# Patient Record
Sex: FEMALE | Race: OTHER | HISPANIC OR LATINO | Employment: FULL TIME | URBAN - METROPOLITAN AREA
[De-identification: names, ages, dates, MRNs, and addresses within clinical notes are randomized per-mention and may not be internally consistent; named-entity substitution may affect disease eponyms.]

---

## 2019-05-01 LAB
C TRACH RRNA SPEC QL PROBE: NEGATIVE
CYSTIC FIBROSIS MUTATION PNL: NEGATIVE
GLUCOSE SERPL-MCNC: 90 MG/DL
HBV SURFACE AG SERPL QL IA: NEGATIVE
HCT VFR BLD AUTO: 35 % (ref 36–46)
HGB BLD-MCNC: 11.7 G/DL (ref 12–16)
HIV 1+2 AB+HIV1 P24 AG SERPL QL IA: NON REACTIVE
N GONORRHOEAE, AMPLIFIED DNA: NEGATIVE
PLATELET # BLD AUTO: 267 K/?L (ref 150–399)
RPR: NON REACTIVE
URINE CULTURE, ROUTINE: NEGATIVE

## 2019-08-26 ENCOUNTER — TELEPHONE (OUTPATIENT)
Dept: OBSTETRICS AND GYNECOLOGY | Facility: CLINIC | Age: 39
End: 2019-08-26

## 2019-08-26 NOTE — TELEPHONE ENCOUNTER
Pt is currently 26 weeks pregnant and would like to see if she can switch care to . She is currently seeing Dr.Pratibha Leon at [facility]. Pt said at her 20 week anatomy scan they told her the umbilical chord was a little off centered so they would like to monitor that but other than that it has been a normal healthy pregnancy.

## 2019-08-26 NOTE — TELEPHONE ENCOUNTER
Dr. Augustin will you accept a new Pt. Ob as a transfer from [facility] / I can offer her 09/17/2019 at 11:15 a m at our [place] location, will you want an U/S

## 2019-08-27 NOTE — TELEPHONE ENCOUNTER
L/M stating dr. Augustin will accept her as a new ob  Transfer from [facility].  Pt. Instructed to bring or fax her OB rds.

## 2019-09-17 ENCOUNTER — INITIAL PRENATAL (OUTPATIENT)
Dept: OBSTETRICS AND GYNECOLOGY | Facility: CLINIC | Age: 39
End: 2019-09-17
Payer: COMMERCIAL

## 2019-09-17 VITALS — DIASTOLIC BLOOD PRESSURE: 60 MMHG | SYSTOLIC BLOOD PRESSURE: 98 MMHG | WEIGHT: 155.88 LBS

## 2019-09-17 DIAGNOSIS — O09.523 ELDERLY MULTIGRAVIDA IN THIRD TRIMESTER: ICD-10-CM

## 2019-09-17 DIAGNOSIS — Z3A.28 28 WEEKS GESTATION OF PREGNANCY: ICD-10-CM

## 2019-09-17 DIAGNOSIS — O43.123 VELAMENTOUS INSERTION OF UMBILICAL CORD IN THIRD TRIMESTER: Primary | ICD-10-CM

## 2019-09-17 PROCEDURE — 0502F PR SUBSEQUENT PRENATAL CARE: ICD-10-PCS | Mod: CPTII,S$GLB,, | Performed by: OBSTETRICS & GYNECOLOGY

## 2019-09-17 PROCEDURE — 0502F SUBSEQUENT PRENATAL CARE: CPT | Mod: CPTII,S$GLB,, | Performed by: OBSTETRICS & GYNECOLOGY

## 2019-09-17 PROCEDURE — 99999 PR PBB SHADOW E&M-EST. PATIENT-LVL II: CPT | Mod: PBBFAC,,, | Performed by: OBSTETRICS & GYNECOLOGY

## 2019-09-17 PROCEDURE — 99999 PR PBB SHADOW E&M-EST. PATIENT-LVL II: ICD-10-PCS | Mod: PBBFAC,,, | Performed by: OBSTETRICS & GYNECOLOGY

## 2019-09-18 ENCOUNTER — LAB VISIT (OUTPATIENT)
Dept: LAB | Facility: OTHER | Age: 39
End: 2019-09-18
Attending: OBSTETRICS & GYNECOLOGY
Payer: COMMERCIAL

## 2019-09-18 DIAGNOSIS — O43.123 VELAMENTOUS INSERTION OF UMBILICAL CORD IN THIRD TRIMESTER: ICD-10-CM

## 2019-09-18 DIAGNOSIS — Z3A.28 28 WEEKS GESTATION OF PREGNANCY: ICD-10-CM

## 2019-09-18 LAB
ABO + RH BLD: NORMAL
BASOPHILS # BLD AUTO: 0.03 K/UL (ref 0–0.2)
BASOPHILS NFR BLD: 0.4 % (ref 0–1.9)
DIFFERENTIAL METHOD: ABNORMAL
EOSINOPHIL # BLD AUTO: 0.3 K/UL (ref 0–0.5)
EOSINOPHIL NFR BLD: 3.4 % (ref 0–8)
ERYTHROCYTE [DISTWIDTH] IN BLOOD BY AUTOMATED COUNT: 12.9 % (ref 11.5–14.5)
GLUCOSE SERPL-MCNC: 65 MG/DL (ref 70–140)
HCT VFR BLD AUTO: 33.2 % (ref 37–48.5)
HGB BLD-MCNC: 11.1 G/DL (ref 12–16)
IMM GRANULOCYTES # BLD AUTO: 0.16 K/UL (ref 0–0.04)
IMM GRANULOCYTES NFR BLD AUTO: 2 % (ref 0–0.5)
LYMPHOCYTES # BLD AUTO: 1.4 K/UL (ref 1–4.8)
LYMPHOCYTES NFR BLD: 17.9 % (ref 18–48)
MCH RBC QN AUTO: 31.2 PG (ref 27–31)
MCHC RBC AUTO-ENTMCNC: 33.4 G/DL (ref 32–36)
MCV RBC AUTO: 93 FL (ref 82–98)
MONOCYTES # BLD AUTO: 0.7 K/UL (ref 0.3–1)
MONOCYTES NFR BLD: 8.3 % (ref 4–15)
NEUTROPHILS # BLD AUTO: 5.5 K/UL (ref 1.8–7.7)
NEUTROPHILS NFR BLD: 68 % (ref 38–73)
NRBC BLD-RTO: 0 /100 WBC
PLATELET # BLD AUTO: 207 K/UL (ref 150–350)
PMV BLD AUTO: 11.1 FL (ref 9.2–12.9)
RBC # BLD AUTO: 3.56 M/UL (ref 4–5.4)
WBC # BLD AUTO: 8 K/UL (ref 3.9–12.7)

## 2019-09-18 PROCEDURE — 82950 GLUCOSE TEST: CPT

## 2019-09-18 PROCEDURE — 85025 COMPLETE CBC W/AUTO DIFF WBC: CPT

## 2019-09-18 PROCEDURE — 36415 COLL VENOUS BLD VENIPUNCTURE: CPT

## 2019-09-18 PROCEDURE — 86901 BLOOD TYPING SEROLOGIC RH(D): CPT

## 2019-09-20 ENCOUNTER — TELEPHONE (OUTPATIENT)
Dept: OBSTETRICS AND GYNECOLOGY | Facility: CLINIC | Age: 39
End: 2019-09-20

## 2019-09-20 NOTE — TELEPHONE ENCOUNTER
----- Message from Adal Augustin MD sent at 9/19/2019  5:45 PM CDT -----  Make sure patient gets my portal message and if not active in the portal call patient and relay my message.    Good News! All of your blood work came back normal. Please call the office if you have any questions.   Sincerely,  Dr. Augustin

## 2019-10-01 ENCOUNTER — PROCEDURE VISIT (OUTPATIENT)
Dept: OBSTETRICS AND GYNECOLOGY | Facility: CLINIC | Age: 39
End: 2019-10-01
Payer: COMMERCIAL

## 2019-10-01 ENCOUNTER — CLINICAL SUPPORT (OUTPATIENT)
Dept: OBSTETRICS AND GYNECOLOGY | Facility: CLINIC | Age: 39
End: 2019-10-01
Payer: COMMERCIAL

## 2019-10-01 ENCOUNTER — ROUTINE PRENATAL (OUTPATIENT)
Dept: OBSTETRICS AND GYNECOLOGY | Facility: CLINIC | Age: 39
End: 2019-10-01
Payer: COMMERCIAL

## 2019-10-01 VITALS — SYSTOLIC BLOOD PRESSURE: 100 MMHG | WEIGHT: 156.94 LBS | DIASTOLIC BLOOD PRESSURE: 60 MMHG

## 2019-10-01 DIAGNOSIS — Z34.80 SUPERVISION OF OTHER NORMAL PREGNANCY, ANTEPARTUM: Primary | ICD-10-CM

## 2019-10-01 DIAGNOSIS — Z23 NEED FOR TDAP VACCINATION: ICD-10-CM

## 2019-10-01 DIAGNOSIS — Z23 NEED FOR TDAP VACCINATION: Primary | ICD-10-CM

## 2019-10-01 DIAGNOSIS — Z3A.30 30 WEEKS GESTATION OF PREGNANCY: ICD-10-CM

## 2019-10-01 DIAGNOSIS — O43.123 VELAMENTOUS INSERTION OF UMBILICAL CORD IN THIRD TRIMESTER: ICD-10-CM

## 2019-10-01 DIAGNOSIS — Z3A.28 28 WEEKS GESTATION OF PREGNANCY: ICD-10-CM

## 2019-10-01 DIAGNOSIS — O26.849 FETAL SIZE INCONSISTENT WITH DATES: ICD-10-CM

## 2019-10-01 DIAGNOSIS — O09.523 ELDERLY MULTIGRAVIDA IN THIRD TRIMESTER: ICD-10-CM

## 2019-10-01 PROCEDURE — 90471 TDAP VACCINE GREATER THAN OR EQUAL TO 7YO IM: ICD-10-PCS | Mod: S$GLB,,, | Performed by: OBSTETRICS & GYNECOLOGY

## 2019-10-01 PROCEDURE — 90471 IMMUNIZATION ADMIN: CPT | Mod: S$GLB,,, | Performed by: OBSTETRICS & GYNECOLOGY

## 2019-10-01 PROCEDURE — 76816 OB US FOLLOW-UP PER FETUS: CPT | Mod: S$GLB,,, | Performed by: OBSTETRICS & GYNECOLOGY

## 2019-10-01 PROCEDURE — 90715 TDAP VACCINE 7 YRS/> IM: CPT | Mod: S$GLB,,, | Performed by: OBSTETRICS & GYNECOLOGY

## 2019-10-01 PROCEDURE — 0502F PR SUBSEQUENT PRENATAL CARE: ICD-10-PCS | Mod: CPTII,S$GLB,, | Performed by: OBSTETRICS & GYNECOLOGY

## 2019-10-01 PROCEDURE — 0502F SUBSEQUENT PRENATAL CARE: CPT | Mod: CPTII,S$GLB,, | Performed by: OBSTETRICS & GYNECOLOGY

## 2019-10-01 PROCEDURE — 76816 PR  US,PREGNANT UTERUS,F/U,TRANSABD APP: ICD-10-PCS | Mod: S$GLB,,, | Performed by: OBSTETRICS & GYNECOLOGY

## 2019-10-01 PROCEDURE — 90715 TDAP VACCINE GREATER THAN OR EQUAL TO 7YO IM: ICD-10-PCS | Mod: S$GLB,,, | Performed by: OBSTETRICS & GYNECOLOGY

## 2019-10-01 PROCEDURE — 99999 PR PBB SHADOW E&M-EST. PATIENT-LVL III: CPT | Mod: PBBFAC,,, | Performed by: OBSTETRICS & GYNECOLOGY

## 2019-10-01 PROCEDURE — 99999 PR PBB SHADOW E&M-EST. PATIENT-LVL III: ICD-10-PCS | Mod: PBBFAC,,, | Performed by: OBSTETRICS & GYNECOLOGY

## 2019-10-01 NOTE — PROGRESS NOTES
Ordering Provider: Dr. Augustin    During visit today patient received an injection of tdap to right deltoid.  Tolerated well.  Instructed pt to remain 15 minutes after injection to monitor for reactions.      Pre pain scale: none    Post pain scale: none

## 2019-10-17 ENCOUNTER — ROUTINE PRENATAL (OUTPATIENT)
Dept: OBSTETRICS AND GYNECOLOGY | Facility: CLINIC | Age: 39
End: 2019-10-17
Attending: OBSTETRICS & GYNECOLOGY
Payer: COMMERCIAL

## 2019-10-17 VITALS — WEIGHT: 160.81 LBS | SYSTOLIC BLOOD PRESSURE: 100 MMHG | DIASTOLIC BLOOD PRESSURE: 58 MMHG

## 2019-10-17 DIAGNOSIS — Z34.80 SUPERVISION OF OTHER NORMAL PREGNANCY, ANTEPARTUM: Primary | ICD-10-CM

## 2019-10-17 PROCEDURE — 0502F PR SUBSEQUENT PRENATAL CARE: ICD-10-PCS | Mod: CPTII,S$GLB,, | Performed by: OBSTETRICS & GYNECOLOGY

## 2019-10-17 PROCEDURE — 99999 PR PBB SHADOW E&M-EST. PATIENT-LVL II: ICD-10-PCS | Mod: PBBFAC,,, | Performed by: OBSTETRICS & GYNECOLOGY

## 2019-10-17 PROCEDURE — 0502F SUBSEQUENT PRENATAL CARE: CPT | Mod: CPTII,S$GLB,, | Performed by: OBSTETRICS & GYNECOLOGY

## 2019-10-17 PROCEDURE — 99999 PR PBB SHADOW E&M-EST. PATIENT-LVL II: CPT | Mod: PBBFAC,,, | Performed by: OBSTETRICS & GYNECOLOGY

## 2019-10-31 ENCOUNTER — ROUTINE PRENATAL (OUTPATIENT)
Dept: OBSTETRICS AND GYNECOLOGY | Facility: CLINIC | Age: 39
End: 2019-10-31
Attending: OBSTETRICS & GYNECOLOGY
Payer: COMMERCIAL

## 2019-10-31 VITALS — WEIGHT: 161.38 LBS | SYSTOLIC BLOOD PRESSURE: 100 MMHG | DIASTOLIC BLOOD PRESSURE: 60 MMHG

## 2019-10-31 DIAGNOSIS — Z34.80 SUPERVISION OF OTHER NORMAL PREGNANCY, ANTEPARTUM: Primary | ICD-10-CM

## 2019-10-31 DIAGNOSIS — O26.849 FETAL SIZE INCONSISTENT WITH DATES: ICD-10-CM

## 2019-10-31 DIAGNOSIS — O09.523 ELDERLY MULTIGRAVIDA IN THIRD TRIMESTER: ICD-10-CM

## 2019-10-31 DIAGNOSIS — R35.0 URINARY FREQUENCY: ICD-10-CM

## 2019-10-31 DIAGNOSIS — O43.123 VELAMENTOUS INSERTION OF UMBILICAL CORD IN THIRD TRIMESTER: ICD-10-CM

## 2019-10-31 DIAGNOSIS — Z3A.30 30 WEEKS GESTATION OF PREGNANCY: ICD-10-CM

## 2019-10-31 PROCEDURE — 99999 PR PBB SHADOW E&M-EST. PATIENT-LVL III: CPT | Mod: PBBFAC,,, | Performed by: OBSTETRICS & GYNECOLOGY

## 2019-10-31 PROCEDURE — 76816 PR  US,PREGNANT UTERUS,F/U,TRANSABD APP: ICD-10-PCS | Mod: S$GLB,,, | Performed by: OBSTETRICS & GYNECOLOGY

## 2019-10-31 PROCEDURE — 87088 URINE BACTERIA CULTURE: CPT

## 2019-10-31 PROCEDURE — 76819 PR US, OB, FETAL BIOPHYSICAL, W/O NST: ICD-10-PCS | Mod: S$GLB,,, | Performed by: OBSTETRICS & GYNECOLOGY

## 2019-10-31 PROCEDURE — 76816 OB US FOLLOW-UP PER FETUS: CPT | Mod: S$GLB,,, | Performed by: OBSTETRICS & GYNECOLOGY

## 2019-10-31 PROCEDURE — 87086 URINE CULTURE/COLONY COUNT: CPT

## 2019-10-31 PROCEDURE — 76819 FETAL BIOPHYS PROFIL W/O NST: CPT | Mod: S$GLB,,, | Performed by: OBSTETRICS & GYNECOLOGY

## 2019-10-31 PROCEDURE — 99999 PR PBB SHADOW E&M-EST. PATIENT-LVL III: ICD-10-PCS | Mod: PBBFAC,,, | Performed by: OBSTETRICS & GYNECOLOGY

## 2019-10-31 PROCEDURE — 0502F SUBSEQUENT PRENATAL CARE: CPT | Mod: CPTII,S$GLB,, | Performed by: OBSTETRICS & GYNECOLOGY

## 2019-10-31 PROCEDURE — 0502F PR SUBSEQUENT PRENATAL CARE: ICD-10-PCS | Mod: CPTII,S$GLB,, | Performed by: OBSTETRICS & GYNECOLOGY

## 2019-10-31 RX ORDER — NITROFURANTOIN 25; 75 MG/1; MG/1
100 CAPSULE ORAL 2 TIMES DAILY
Qty: 14 CAPSULE | Refills: 0 | Status: SHIPPED | OUTPATIENT
Start: 2019-10-31 | End: 2019-11-07

## 2019-10-31 NOTE — PROGRESS NOTES
Normal growth scan due to velamentous cord insert.  Thought had a uti but symptoms resolved.  Send urine cx.  rx for macrobid  
Trace of leukocytes in urine today  
“You can access the Follow[facility] Patient Portal, offered by [facility], by registering with the following website: [email or web address]”

## 2019-11-02 LAB — BACTERIA UR CULT: ABNORMAL

## 2019-11-05 ENCOUNTER — TELEPHONE (OUTPATIENT)
Dept: OBSTETRICS AND GYNECOLOGY | Facility: CLINIC | Age: 39
End: 2019-11-05

## 2019-11-05 DIAGNOSIS — N30.00 ACUTE CYSTITIS WITHOUT HEMATURIA: Primary | ICD-10-CM

## 2019-11-05 NOTE — TELEPHONE ENCOUNTER
----- Message from Adal Augustin MD sent at 11/5/2019  4:58 AM CST -----  Call patient looks like had vaginal contaminant on her last urine cx.  Have her come in for a repeat urine cx only this week.

## 2019-11-06 ENCOUNTER — TELEPHONE (OUTPATIENT)
Dept: OBSTETRICS AND GYNECOLOGY | Facility: CLINIC | Age: 39
End: 2019-11-06

## 2019-11-06 NOTE — TELEPHONE ENCOUNTER
Spoke with pt. And gave urine culture results, and she said she is already on anitbiotics, and wants to get urine checked till next week.

## 2019-11-14 ENCOUNTER — PROCEDURE VISIT (OUTPATIENT)
Dept: OBSTETRICS AND GYNECOLOGY | Facility: CLINIC | Age: 39
End: 2019-11-14
Attending: OBSTETRICS & GYNECOLOGY
Payer: COMMERCIAL

## 2019-11-14 VITALS — DIASTOLIC BLOOD PRESSURE: 60 MMHG | SYSTOLIC BLOOD PRESSURE: 100 MMHG | WEIGHT: 167.5 LBS

## 2019-11-14 DIAGNOSIS — Z34.90 PREGNANCY, UNSPECIFIED GESTATIONAL AGE: Primary | ICD-10-CM

## 2019-11-14 DIAGNOSIS — O09.523 MULTIGRAVIDA OF ADVANCED MATERNAL AGE IN THIRD TRIMESTER: ICD-10-CM

## 2019-11-14 DIAGNOSIS — N30.00 ACUTE CYSTITIS WITHOUT HEMATURIA: ICD-10-CM

## 2019-11-14 PROCEDURE — 87081 CULTURE SCREEN ONLY: CPT

## 2019-11-14 PROCEDURE — 0502F PR SUBSEQUENT PRENATAL CARE: ICD-10-PCS | Mod: CPTII,S$GLB,, | Performed by: OBSTETRICS & GYNECOLOGY

## 2019-11-14 PROCEDURE — 99999 PR PBB SHADOW E&M-EST. PATIENT-LVL III: CPT | Mod: PBBFAC,,, | Performed by: OBSTETRICS & GYNECOLOGY

## 2019-11-14 PROCEDURE — 0502F SUBSEQUENT PRENATAL CARE: CPT | Mod: CPTII,S$GLB,, | Performed by: OBSTETRICS & GYNECOLOGY

## 2019-11-14 PROCEDURE — 76819 FETAL BIOPHYS PROFIL W/O NST: CPT | Mod: S$GLB,,, | Performed by: OBSTETRICS & GYNECOLOGY

## 2019-11-14 PROCEDURE — 99999 PR PBB SHADOW E&M-EST. PATIENT-LVL III: ICD-10-PCS | Mod: PBBFAC,,, | Performed by: OBSTETRICS & GYNECOLOGY

## 2019-11-14 PROCEDURE — 76819 PR US, OB, FETAL BIOPHYSICAL, W/O NST: ICD-10-PCS | Mod: S$GLB,,, | Performed by: OBSTETRICS & GYNECOLOGY

## 2019-11-14 PROCEDURE — 87086 URINE CULTURE/COLONY COUNT: CPT

## 2019-11-15 ENCOUNTER — TELEPHONE (OUTPATIENT)
Dept: OBSTETRICS AND GYNECOLOGY | Facility: CLINIC | Age: 39
End: 2019-11-15

## 2019-11-16 LAB — BACTERIA UR CULT: NO GROWTH

## 2019-11-16 NOTE — PROGRESS NOTES
Need weekly BPP is due to AMA age 40.  Also recommend induction and delivery by 40 weeks.     Group B strep and consents for delivery done today.  BPP 8 of 8 today.

## 2019-11-18 LAB — BACTERIA SPEC AEROBE CULT: NORMAL

## 2019-11-21 ENCOUNTER — PROCEDURE VISIT (OUTPATIENT)
Dept: OBSTETRICS AND GYNECOLOGY | Facility: CLINIC | Age: 39
End: 2019-11-21
Attending: OBSTETRICS & GYNECOLOGY
Payer: COMMERCIAL

## 2019-11-21 ENCOUNTER — TELEPHONE (OUTPATIENT)
Dept: OBSTETRICS AND GYNECOLOGY | Facility: CLINIC | Age: 39
End: 2019-11-21

## 2019-11-21 VITALS — WEIGHT: 164.44 LBS | DIASTOLIC BLOOD PRESSURE: 60 MMHG | SYSTOLIC BLOOD PRESSURE: 100 MMHG

## 2019-11-21 DIAGNOSIS — Z34.80 SUPERVISION OF OTHER NORMAL PREGNANCY, ANTEPARTUM: Primary | ICD-10-CM

## 2019-11-21 DIAGNOSIS — O43.123 VELAMENTOUS INSERTION OF UMBILICAL CORD IN THIRD TRIMESTER: ICD-10-CM

## 2019-11-21 DIAGNOSIS — O09.523 MULTIGRAVIDA OF ADVANCED MATERNAL AGE IN THIRD TRIMESTER: ICD-10-CM

## 2019-11-21 DIAGNOSIS — O09.523 MULTIGRAVIDA OF ADVANCED MATERNAL AGE IN THIRD TRIMESTER: Primary | ICD-10-CM

## 2019-11-21 PROCEDURE — 99999 PR PBB SHADOW E&M-EST. PATIENT-LVL III: CPT | Mod: PBBFAC,,, | Performed by: OBSTETRICS & GYNECOLOGY

## 2019-11-21 PROCEDURE — 76819 FETAL BIOPHYS PROFIL W/O NST: CPT | Mod: S$GLB,,, | Performed by: OBSTETRICS & GYNECOLOGY

## 2019-11-21 PROCEDURE — 0502F PR SUBSEQUENT PRENATAL CARE: ICD-10-PCS | Mod: CPTII,S$GLB,, | Performed by: OBSTETRICS & GYNECOLOGY

## 2019-11-21 PROCEDURE — 76819 PR US, OB, FETAL BIOPHYSICAL, W/O NST: ICD-10-PCS | Mod: S$GLB,,, | Performed by: OBSTETRICS & GYNECOLOGY

## 2019-11-21 PROCEDURE — 99999 PR PBB SHADOW E&M-EST. PATIENT-LVL III: ICD-10-PCS | Mod: PBBFAC,,, | Performed by: OBSTETRICS & GYNECOLOGY

## 2019-11-21 PROCEDURE — 0502F SUBSEQUENT PRENATAL CARE: CPT | Mod: CPTII,S$GLB,, | Performed by: OBSTETRICS & GYNECOLOGY

## 2019-11-21 NOTE — TELEPHONE ENCOUNTER
Schedule 9pm or midnight indxn on Monday Dec 9 - hospitalist to deliver - indication is AMA age 40 and velamentous cord insert

## 2019-11-21 NOTE — TELEPHONE ENCOUNTER
Requested 12/10 at 4AM.  12/9 PM had several induction requests and 12/10 midnight already had a couple as well.

## 2019-11-21 NOTE — PROGRESS NOTES
Obstetrical ultrasound completed today.  See report in imaging section of [facility].

## 2019-11-21 NOTE — PROGRESS NOTES
Patient would like to wait for induction until after 40 weeks due to her daughter's birthday but also she desires to labor spontaneously.  Due to her age and velamentous cord insert I recommend delivery prior to 40 weeks however we are doing fetal testing as long as fetal testing is reassuring that we will schedule her induction a few days overdue on the patient's requested date.    BPP 8 of 8 today.

## 2019-11-25 ENCOUNTER — TELEPHONE (OUTPATIENT)
Dept: OBSTETRICS AND GYNECOLOGY | Facility: CLINIC | Age: 39
End: 2019-11-25

## 2019-11-25 NOTE — TELEPHONE ENCOUNTER
Pt needs a more detailed receipt for hospital payment. Pt due date is 12/06/19. Please call pt [phone]

## 2019-11-26 ENCOUNTER — PROCEDURE VISIT (OUTPATIENT)
Dept: OBSTETRICS AND GYNECOLOGY | Facility: CLINIC | Age: 39
End: 2019-11-26
Payer: COMMERCIAL

## 2019-11-26 ENCOUNTER — ROUTINE PRENATAL (OUTPATIENT)
Dept: OBSTETRICS AND GYNECOLOGY | Facility: CLINIC | Age: 39
End: 2019-11-26
Payer: COMMERCIAL

## 2019-11-26 VITALS — SYSTOLIC BLOOD PRESSURE: 100 MMHG | WEIGHT: 164.25 LBS | DIASTOLIC BLOOD PRESSURE: 60 MMHG

## 2019-11-26 DIAGNOSIS — O09.523 ELDERLY MULTIGRAVIDA IN THIRD TRIMESTER: ICD-10-CM

## 2019-11-26 DIAGNOSIS — Z34.80 SUPERVISION OF OTHER NORMAL PREGNANCY, ANTEPARTUM: Primary | ICD-10-CM

## 2019-11-26 DIAGNOSIS — O26.849 FETAL SIZE INCONSISTENT WITH DATES: ICD-10-CM

## 2019-11-26 DIAGNOSIS — O43.123 VELAMENTOUS INSERTION OF UMBILICAL CORD IN THIRD TRIMESTER: ICD-10-CM

## 2019-11-26 DIAGNOSIS — Z3A.30 30 WEEKS GESTATION OF PREGNANCY: ICD-10-CM

## 2019-11-26 PROCEDURE — 0502F SUBSEQUENT PRENATAL CARE: CPT | Mod: CPTII,S$GLB,, | Performed by: OBSTETRICS & GYNECOLOGY

## 2019-11-26 PROCEDURE — 99999 PR PBB SHADOW E&M-EST. PATIENT-LVL III: ICD-10-PCS | Mod: PBBFAC,,, | Performed by: OBSTETRICS & GYNECOLOGY

## 2019-11-26 PROCEDURE — 76816 PR  US,PREGNANT UTERUS,F/U,TRANSABD APP: ICD-10-PCS | Mod: S$GLB,,, | Performed by: OBSTETRICS & GYNECOLOGY

## 2019-11-26 PROCEDURE — 76816 OB US FOLLOW-UP PER FETUS: CPT | Mod: S$GLB,,, | Performed by: OBSTETRICS & GYNECOLOGY

## 2019-11-26 PROCEDURE — 76819 FETAL BIOPHYS PROFIL W/O NST: CPT | Mod: S$GLB,,, | Performed by: OBSTETRICS & GYNECOLOGY

## 2019-11-26 PROCEDURE — 76819 PR US, OB, FETAL BIOPHYSICAL, W/O NST: ICD-10-PCS | Mod: S$GLB,,, | Performed by: OBSTETRICS & GYNECOLOGY

## 2019-11-26 PROCEDURE — 99999 PR PBB SHADOW E&M-EST. PATIENT-LVL III: CPT | Mod: PBBFAC,,, | Performed by: OBSTETRICS & GYNECOLOGY

## 2019-11-26 PROCEDURE — 0502F PR SUBSEQUENT PRENATAL CARE: ICD-10-PCS | Mod: CPTII,S$GLB,, | Performed by: OBSTETRICS & GYNECOLOGY

## 2019-11-26 RX ORDER — SODIUM CHLORIDE, SODIUM LACTATE, POTASSIUM CHLORIDE, CALCIUM CHLORIDE 600; 310; 30; 20 MG/100ML; MG/100ML; MG/100ML; MG/100ML
INJECTION, SOLUTION INTRAVENOUS CONTINUOUS
Status: CANCELLED | OUTPATIENT
Start: 2019-11-26

## 2019-11-26 RX ORDER — OXYTOCIN/RINGER'S LACTATE 30/500 ML
2 PLASTIC BAG, INJECTION (ML) INTRAVENOUS CONTINUOUS
Status: CANCELLED | OUTPATIENT
Start: 2019-11-26

## 2019-11-26 RX ORDER — OXYTOCIN/RINGER'S LACTATE 30/500 ML
334 PLASTIC BAG, INJECTION (ML) INTRAVENOUS ONCE
Status: CANCELLED | OUTPATIENT
Start: 2019-11-26 | End: 2019-11-26

## 2019-11-26 RX ORDER — CLINDAMYCIN PHOSPHATE 900 MG/50ML
900 INJECTION, SOLUTION INTRAVENOUS
Status: CANCELLED | OUTPATIENT
Start: 2019-11-26

## 2019-11-26 RX ORDER — OXYTOCIN 10 [USP'U]/ML
10 INJECTION, SOLUTION INTRAMUSCULAR; INTRAVENOUS
Status: CANCELLED | OUTPATIENT
Start: 2019-11-26

## 2019-11-26 RX ORDER — SIMETHICONE 80 MG
1 TABLET,CHEWABLE ORAL 4 TIMES DAILY PRN
Status: CANCELLED | OUTPATIENT
Start: 2019-11-26

## 2019-11-26 RX ORDER — MISOPROSTOL 100 UG/1
800 TABLET ORAL
Status: CANCELLED | OUTPATIENT
Start: 2019-11-26

## 2019-11-26 RX ORDER — ONDANSETRON 4 MG/1
8 TABLET, ORALLY DISINTEGRATING ORAL EVERY 8 HOURS PRN
Status: CANCELLED | OUTPATIENT
Start: 2019-11-26

## 2019-11-26 RX ORDER — OXYTOCIN/RINGER'S LACTATE 30/500 ML
95 PLASTIC BAG, INJECTION (ML) INTRAVENOUS ONCE
Status: CANCELLED | OUTPATIENT
Start: 2019-11-26 | End: 2019-11-26

## 2019-11-26 RX ORDER — TERBUTALINE SULFATE 1 MG/ML
0.25 INJECTION SUBCUTANEOUS
Status: CANCELLED | OUTPATIENT
Start: 2019-11-26

## 2019-11-26 RX ORDER — MISOPROSTOL 100 MCG
25 TABLET ORAL EVERY 4 HOURS
Status: CANCELLED | OUTPATIENT
Start: 2019-11-26 | End: 2019-11-27

## 2019-11-26 RX ORDER — DIPHENOXYLATE HYDROCHLORIDE AND ATROPINE SULFATE 2.5; .025 MG/1; MG/1
1 TABLET ORAL 4 TIMES DAILY PRN
Status: CANCELLED | OUTPATIENT
Start: 2019-11-26

## 2019-11-26 RX ORDER — SODIUM CHLORIDE 9 MG/ML
INJECTION, SOLUTION INTRAVENOUS
Status: CANCELLED | OUTPATIENT
Start: 2019-11-26

## 2019-11-26 RX ORDER — CALCIUM CARBONATE 200(500)MG
500 TABLET,CHEWABLE ORAL 3 TIMES DAILY PRN
Status: CANCELLED | OUTPATIENT
Start: 2019-11-26

## 2019-11-26 RX ORDER — CARBOPROST TROMETHAMINE 250 UG/ML
250 INJECTION, SOLUTION INTRAMUSCULAR
Status: CANCELLED | OUTPATIENT
Start: 2019-11-26

## 2019-11-26 RX ORDER — METHYLERGONOVINE MALEATE 0.2 MG/ML
200 INJECTION INTRAVENOUS
Status: CANCELLED | OUTPATIENT
Start: 2019-11-26

## 2019-11-26 NOTE — PROGRESS NOTES
Obstetrical ultrasound completed today.  See report in imaging section of [facility].

## 2019-11-26 NOTE — H&P
[facility]  History & Physical  Obstetrics      SUBJECTIVE:     Chief Complaint/Reason for Admission: Induction of labor    History of Present Illness:  Rina Hurtado is a 38 y.o.  female with an Estimated Date of Delivery: 19 admitted for induction of labor.  Her current obstetrical history is complicated by velamentous cord insert and AMA age 38.  She transferred care to me at 28 weeks from [facility].       (Not in a hospital admission)    Review of patient's allergies indicates:  No Known Allergies     Past Medical History:   Diagnosis Date    AMA (advanced maternal age) multigravida 35+      History reviewed. No pertinent surgical history.  Family History   Problem Relation Age of Onset    No Known Problems Father     Diabetes Mother     No Known Problems Brother     Breast cancer Neg Hx     Colon cancer Neg Hx     Stroke Neg Hx     Ovarian cancer Neg Hx      Social History     Tobacco Use    Smoking status: Former Smoker    Smokeless tobacco: Never Used   Substance Use Topics    Alcohol use: Not Currently    Drug use: Never       Review of Systems  Constitutional: no fever or chills  Eyes: no visual changes  Respiratory: no cough or shortness of breath  Cardiovascular: no chest pain or palpitations  Gastrointestinal: no nausea or vomiting, tolerating diet  Genitourinary: no hematuria or dysuria     OBJECTIVE:     Vital Signs (Most Recent):  BP: 100/60 (19 1520)    Physical Exam:  General:  alert and normal appearing gravid female   Skin:  Skin color, texture, turgor normal. No rashes or lesions   HEENT:  conjunctivae/corneas clear. PERRL.   Lungs:  clear to auscultation bilaterally   Heart:  regular rate and rhythm, S1, S2 normal, no murmur, click, rub or gallop   Breasts:     Abdomen:  soft, non-tender; bowel sounds normal   Uterine Size:     Presentations:  cephalic   FHT:  Reactive   Pelvis:    Cervix:     Dilation:     Effacement:     Station:      Consistency:      Position:      Laboratory:  Lab Results   Component Value Date    GROUPTR O POS 2019    HEPBSAG Negative 2019        Diagnostic Results:      ASSESSMENT/PLAN:     40+wks gestation.     Conditions: velamentous cords insert, AMA age 38     Risks, benefits, alternatives and possible complications have been discussed in detail with the patient.  Pre-admission, admission, and post admission procedures and expectations were discussed in detail.  All questions answered, all appropriate consents will be signed at the Hospital. Admit

## 2019-12-05 ENCOUNTER — HOSPITAL ENCOUNTER (EMERGENCY)
Facility: OTHER | Age: 39
Discharge: HOME | End: 2019-12-05
Attending: OBSTETRICS & GYNECOLOGY
Payer: COMMERCIAL

## 2019-12-05 ENCOUNTER — PROCEDURE VISIT (OUTPATIENT)
Dept: OBSTETRICS AND GYNECOLOGY | Facility: CLINIC | Age: 39
End: 2019-12-05
Attending: OBSTETRICS & GYNECOLOGY
Payer: COMMERCIAL

## 2019-12-05 VITALS
TEMPERATURE: 98 F | HEART RATE: 79 BPM | SYSTOLIC BLOOD PRESSURE: 101 MMHG | RESPIRATION RATE: 16 BRPM | DIASTOLIC BLOOD PRESSURE: 53 MMHG | OXYGEN SATURATION: 96 %

## 2019-12-05 VITALS — DIASTOLIC BLOOD PRESSURE: 66 MMHG | SYSTOLIC BLOOD PRESSURE: 100 MMHG | WEIGHT: 167.56 LBS

## 2019-12-05 DIAGNOSIS — Z3A.39 39 WEEKS GESTATION OF PREGNANCY: Primary | ICD-10-CM

## 2019-12-05 DIAGNOSIS — O09.523 MULTIGRAVIDA OF ADVANCED MATERNAL AGE IN THIRD TRIMESTER: ICD-10-CM

## 2019-12-05 DIAGNOSIS — Z34.80 SUPERVISION OF OTHER NORMAL PREGNANCY, ANTEPARTUM: Primary | ICD-10-CM

## 2019-12-05 DIAGNOSIS — Z03.71 NO LEAKAGE OF AMNIOTIC FLUID INTO VAGINA: ICD-10-CM

## 2019-12-05 DIAGNOSIS — O09.523 ADVANCED MATERNAL AGE IN MULTIGRAVIDA, THIRD TRIMESTER: ICD-10-CM

## 2019-12-05 PROCEDURE — 76819 FETAL BIOPHYS PROFIL W/O NST: CPT | Mod: S$GLB,,, | Performed by: OBSTETRICS & GYNECOLOGY

## 2019-12-05 PROCEDURE — 59025 FETAL NON-STRESS TEST: CPT

## 2019-12-05 PROCEDURE — 99283 PR EMERGENCY DEPT VISIT,LEVEL III: ICD-10-PCS | Mod: 25,,, | Performed by: OBSTETRICS & GYNECOLOGY

## 2019-12-05 PROCEDURE — 0502F SUBSEQUENT PRENATAL CARE: CPT | Mod: CPTII,S$GLB,, | Performed by: OBSTETRICS & GYNECOLOGY

## 2019-12-05 PROCEDURE — 59025 FETAL NON-STRESS TEST: CPT | Mod: 26,,, | Performed by: OBSTETRICS & GYNECOLOGY

## 2019-12-05 PROCEDURE — 99999 PR PBB SHADOW E&M-EST. PATIENT-LVL III: CPT | Mod: PBBFAC,,, | Performed by: OBSTETRICS & GYNECOLOGY

## 2019-12-05 PROCEDURE — 99999 PR PBB SHADOW E&M-EST. PATIENT-LVL III: ICD-10-PCS | Mod: PBBFAC,,, | Performed by: OBSTETRICS & GYNECOLOGY

## 2019-12-05 PROCEDURE — 59025 PR FETAL 2N-STRESS TEST: ICD-10-PCS | Mod: 26,,, | Performed by: OBSTETRICS & GYNECOLOGY

## 2019-12-05 PROCEDURE — 99284 EMERGENCY DEPT VISIT MOD MDM: CPT | Mod: 25

## 2019-12-05 PROCEDURE — 99283 EMERGENCY DEPT VISIT LOW MDM: CPT | Mod: 25,,, | Performed by: OBSTETRICS & GYNECOLOGY

## 2019-12-05 PROCEDURE — 0502F PR SUBSEQUENT PRENATAL CARE: ICD-10-PCS | Mod: CPTII,S$GLB,, | Performed by: OBSTETRICS & GYNECOLOGY

## 2019-12-05 PROCEDURE — 76819 PR US, OB, FETAL BIOPHYSICAL, W/O NST: ICD-10-PCS | Mod: S$GLB,,, | Performed by: OBSTETRICS & GYNECOLOGY

## 2019-12-05 RX ORDER — SODIUM CHLORIDE, SODIUM LACTATE, POTASSIUM CHLORIDE, CALCIUM CHLORIDE 600; 310; 30; 20 MG/100ML; MG/100ML; MG/100ML; MG/100ML
INJECTION, SOLUTION INTRAVENOUS CONTINUOUS
Status: CANCELLED | OUTPATIENT
Start: 2019-12-05

## 2019-12-05 RX ORDER — OXYTOCIN/RINGER'S LACTATE 30/500 ML
95 PLASTIC BAG, INJECTION (ML) INTRAVENOUS ONCE
Status: CANCELLED | OUTPATIENT
Start: 2019-12-05 | End: 2019-12-05

## 2019-12-05 RX ORDER — CLINDAMYCIN PHOSPHATE 900 MG/50ML
900 INJECTION, SOLUTION INTRAVENOUS
Status: CANCELLED | OUTPATIENT
Start: 2019-12-05

## 2019-12-05 RX ORDER — SODIUM CHLORIDE 9 MG/ML
INJECTION, SOLUTION INTRAVENOUS
Status: CANCELLED | OUTPATIENT
Start: 2019-12-05

## 2019-12-05 RX ORDER — MISOPROSTOL 100 UG/1
800 TABLET ORAL
Status: CANCELLED | OUTPATIENT
Start: 2019-12-05

## 2019-12-05 RX ORDER — CALCIUM CARBONATE 200(500)MG
500 TABLET,CHEWABLE ORAL 3 TIMES DAILY PRN
Status: CANCELLED | OUTPATIENT
Start: 2019-12-05

## 2019-12-05 RX ORDER — SIMETHICONE 80 MG
1 TABLET,CHEWABLE ORAL 4 TIMES DAILY PRN
Status: CANCELLED | OUTPATIENT
Start: 2019-12-05

## 2019-12-05 RX ORDER — OXYTOCIN/RINGER'S LACTATE 30/500 ML
334 PLASTIC BAG, INJECTION (ML) INTRAVENOUS ONCE
Status: CANCELLED | OUTPATIENT
Start: 2019-12-05 | End: 2019-12-05

## 2019-12-05 RX ORDER — OXYTOCIN/RINGER'S LACTATE 30/500 ML
2 PLASTIC BAG, INJECTION (ML) INTRAVENOUS CONTINUOUS
Status: CANCELLED | OUTPATIENT
Start: 2019-12-05

## 2019-12-05 RX ORDER — ONDANSETRON 4 MG/1
8 TABLET, ORALLY DISINTEGRATING ORAL EVERY 8 HOURS PRN
Status: CANCELLED | OUTPATIENT
Start: 2019-12-05

## 2019-12-05 RX ORDER — TERBUTALINE SULFATE 1 MG/ML
0.25 INJECTION SUBCUTANEOUS
Status: CANCELLED | OUTPATIENT
Start: 2019-12-05

## 2019-12-05 RX ORDER — DIPHENOXYLATE HYDROCHLORIDE AND ATROPINE SULFATE 2.5; .025 MG/1; MG/1
1 TABLET ORAL 4 TIMES DAILY PRN
Status: CANCELLED | OUTPATIENT
Start: 2019-12-05

## 2019-12-05 RX ORDER — CARBOPROST TROMETHAMINE 250 UG/ML
250 INJECTION, SOLUTION INTRAMUSCULAR
Status: CANCELLED | OUTPATIENT
Start: 2019-12-05

## 2019-12-05 RX ORDER — MISOPROSTOL 100 MCG
25 TABLET ORAL EVERY 4 HOURS
Status: CANCELLED | OUTPATIENT
Start: 2019-12-05 | End: 2019-12-06

## 2019-12-05 RX ORDER — OXYTOCIN 10 [USP'U]/ML
10 INJECTION, SOLUTION INTRAMUSCULAR; INTRAVENOUS
Status: CANCELLED | OUTPATIENT
Start: 2019-12-05

## 2019-12-05 RX ORDER — METHYLERGONOVINE MALEATE 0.2 MG/ML
200 INJECTION INTRAVENOUS
Status: CANCELLED | OUTPATIENT
Start: 2019-12-05

## 2019-12-05 NOTE — H&P
[facility] [address]  History & Physical  Obstetrics      SUBJECTIVE:     Chief Complaint/Reason for Admission: Induction of labor with pitocin    History of Present Illness:  Rina Hurtado is a 38 y.o.  female with an Estimated Date of Delivery: 19 admitted for induction of labor.  Her current obstetrical history is complicated by AMA age 38, velamentous cord insert and transfer of care at 28 weeks from [facility].        (Not in a hospital admission)    Review of patient's allergies indicates:  No Known Allergies     Past Medical History:   Diagnosis Date    AMA (advanced maternal age) multigravida 35+      History reviewed. No pertinent surgical history.  Family History   Problem Relation Age of Onset    No Known Problems Father     Diabetes Mother     No Known Problems Brother     Breast cancer Neg Hx     Colon cancer Neg Hx     Stroke Neg Hx     Ovarian cancer Neg Hx      Social History     Tobacco Use    Smoking status: Former Smoker    Smokeless tobacco: Never Used   Substance Use Topics    Alcohol use: Not Currently    Drug use: Never       Review of Systems  Constitutional: no fever or chills  Eyes: no visual changes  Respiratory: no cough or shortness of breath  Cardiovascular: no chest pain or palpitations  Gastrointestinal: no nausea or vomiting, tolerating diet  Genitourinary: no hematuria or dysuria     OBJECTIVE:     Vital Signs (Most Recent):  BP: 100/66 (19 0913)    Physical Exam:  General:  alert and normal appearing gravid female   Skin:  Skin color, texture, turgor normal. No rashes or lesions   HEENT:  conjunctivae/corneas clear. PERRL.   Lungs:  clear to auscultation bilaterally   Heart:  regular rate and rhythm, S1, S2 normal, no murmur, click, rub or gallop   Breasts:     Abdomen:  soft, non-tender; bowel sounds normal   Uterine Size:     Presentations:  cephalic   FHT:  Reactive   Pelvis:    Cervix:     Dilation:     Effacement:     Station:       Consistency:     Position:      Laboratory:  Lab Results   Component Value Date    GROUPTRH O POS 2019    HEPBSAG Negative 2019        Diagnostic Results:      ASSESSMENT/PLAN:     40wks 5d gestation.     Conditions: AMA age 38, velamentous cord insert and transfer of care at 28 weeks from [facility].       Risks, benefits, alternatives and possible complications have been discussed in detail with the patient.  Pre-admission, admission, and post admission procedures and expectations were discussed in detail.  All questions answered, all appropriate consents will be signed at the Hospital. Admit

## 2019-12-05 NOTE — ED PROVIDER NOTES
Encounter Date: 2019       History     Chief Complaint   Patient presents with    Rupture of Membranes     r/o ROM       Rina Hurtado is a 38 y.o. [id number] at 39w6d presents complaining of leakage of clear fluid for the last 2 days. She notes that this happened with her last pregnancy and she was not ruptured when she came to the ED however this felt like more fluid, and so she was concerned she has ruptured. She denies ever having a gush of fluid, just stating that it has been a slow trickle.  This IUP is complicated by AMA.  Patient denies painful contractions, denies vaginal bleeding, reports LOF.   Fetal Movement: normal. '  Review of patient's allergies indicates:  No Known Allergies  Past Medical History:   Diagnosis Date    AMA (advanced maternal age) multigravida 35+      No past surgical history on file.  Family History   Problem Relation Age of Onset    No Known Problems Father     Diabetes Mother     No Known Problems Brother     Breast cancer Neg Hx     Colon cancer Neg Hx     Stroke Neg Hx     Ovarian cancer Neg Hx      Social History     Tobacco Use    Smoking status: Former Smoker    Smokeless tobacco: Never Used   Substance Use Topics    Alcohol use: Not Currently    Drug use: Never     Review of Systems   Constitutional: Negative for chills and fever.   HENT: Negative for sore throat.    Eyes: Negative for photophobia and visual disturbance.   Respiratory: Negative for shortness of breath.    Cardiovascular: Negative for chest pain.   Gastrointestinal: Negative for nausea and vomiting.   Genitourinary: Positive for vaginal discharge (clear vaginal fluid x2 days). Negative for dysuria, flank pain, frequency, hematuria and pelvic pain.   Musculoskeletal: Negative for back pain.   Skin: Negative for rash.   Neurological: Negative for dizziness, weakness, light-headedness and headaches.   Hematological: Does not bruise/bleed easily.       Physical Exam     Initial Vitals [19  1227]   BP Pulse Resp Temp SpO2   (!) 101/53 79 16 97.9 °F (36.6 °C) 96 %      MAP       --         Physical Exam    Constitutional: She appears well-developed and well-nourished.   HENT:   Head: Normocephalic and atraumatic.   Eyes: EOM are normal.   Cardiovascular: Normal rate.   Pulmonary/Chest: No respiratory distress.   Abdominal: Soft. Bowel sounds are normal. She exhibits no distension and no mass. There is no tenderness. There is no rebound and no guarding.   Musculoskeletal: Normal range of motion.   Neurological: She is alert and oriented to person, place, and time.   Skin: Skin is warm and dry.   Psychiatric: She has a normal mood and affect.     OB LABOR EXAM:   Pre-Term Labor: No.     Method: Sterile vaginal exam per MD and Sterile speculum exam per MD.   Vaginal Bleeding: none present.     Dilatation: 3.   Station: -3.   Effacement: 60%.       Comments: SVE shows slightly positive nitrizine, however pooling negative and no fluid released with cough. Microscopic exam shows negative ferning.        ED Course   Procedures  Labs Reviewed - No data to display       Imaging Results    None          Medical Decision Making:   Differential Diagnosis:   ROM  Vaginal discharge in pregnancy  UTI  ED Management:  - VSS  - NST reactive and reassuring  - Contractions present but irregular  - Nitrizine slightly positive, but ferning negative and no pooling of fluid present, so likely membranes intact  - Discharged home in stable condition with labor precautions                Attending Attestation:   Physician Attestation Statement for Resident:  As the supervising MD   Physician Attestation Statement: I have personally seen and examined this patient.   I agree with the above history. -:   As the supervising MD I agree with the above PE.    As the supervising MD I agree with the above treatment, course, plan, and disposition.   -:   NST  I independently reviewed the fetal non-stress test with the following  interpretation:  135 BPM baseline  Variability: moderate  Accelerations: present  Decelerations: absent  Contractions: none  Category 1    Clinical Interpretation:reactive    Patient evaluated and found to be stable, agree with resident's assessment of false labor with no leakage of amniotic fluid, advanced maternal age who defers induction today, plans for next Tuesday and plan to discharge to home with precautions re s/s active labor.  I was personally present during the critical portions of the procedure(s) performed by the resident and was immediately available in the ED to provide services and assistance as needed during the entire procedure.  I have reviewed and agree with the residents interpretation of the following: lab data.  I have reviewed the following: old records at this facility.                                  Clinical Impression:       ICD-10-CM ICD-9-CM   1. 39 weeks gestation of pregnancy Z3A.39 V22.2   2. No leakage of amniotic fluid into vagina Z03.71 V89.01                             Dania Castillo MD  12/05/19 1730

## 2019-12-05 NOTE — H&P (VIEW-ONLY)
[facility] [address]  History & Physical  Obstetrics      SUBJECTIVE:     Chief Complaint/Reason for Admission: Induction of labor with pitocin    History of Present Illness:  Rina Hurtado is a 38 y.o.  female with an Estimated Date of Delivery: 19 admitted for induction of labor.  Her current obstetrical history is complicated by AMA age 38, velamentous cord insert and transfer of care at 28 weeks from [facility].        (Not in a hospital admission)    Review of patient's allergies indicates:  No Known Allergies     Past Medical History:   Diagnosis Date    AMA (advanced maternal age) multigravida 35+      History reviewed. No pertinent surgical history.  Family History   Problem Relation Age of Onset    No Known Problems Father     Diabetes Mother     No Known Problems Brother     Breast cancer Neg Hx     Colon cancer Neg Hx     Stroke Neg Hx     Ovarian cancer Neg Hx      Social History     Tobacco Use    Smoking status: Former Smoker    Smokeless tobacco: Never Used   Substance Use Topics    Alcohol use: Not Currently    Drug use: Never       Review of Systems  Constitutional: no fever or chills  Eyes: no visual changes  Respiratory: no cough or shortness of breath  Cardiovascular: no chest pain or palpitations  Gastrointestinal: no nausea or vomiting, tolerating diet  Genitourinary: no hematuria or dysuria     OBJECTIVE:     Vital Signs (Most Recent):  BP: 100/66 (19 0913)    Physical Exam:  General:  alert and normal appearing gravid female   Skin:  Skin color, texture, turgor normal. No rashes or lesions   HEENT:  conjunctivae/corneas clear. PERRL.   Lungs:  clear to auscultation bilaterally   Heart:  regular rate and rhythm, S1, S2 normal, no murmur, click, rub or gallop   Breasts:     Abdomen:  soft, non-tender; bowel sounds normal   Uterine Size:     Presentations:  cephalic   FHT:  Reactive   Pelvis:    Cervix:     Dilation:     Effacement:     Station:       Consistency:     Position:      Laboratory:  Lab Results   Component Value Date    GROUPTRH O POS 2019    HEPBSAG Negative 2019        Diagnostic Results:      ASSESSMENT/PLAN:     40wks 5d gestation.     Conditions: AMA age 38, velamentous cord insert and transfer of care at 28 weeks from [facility].       Risks, benefits, alternatives and possible complications have been discussed in detail with the patient.  Pre-admission, admission, and post admission procedures and expectations were discussed in detail.  All questions answered, all appropriate consents will be signed at the Hospital. Admit

## 2019-12-05 NOTE — PROGRESS NOTES
Feels likes she has been leaking past 3 days.  Notices it more at night. SSE +pool +nitrazene neg ferning but appears grossly ruptured on exam.  In light of her risk factors recommend proceed with delivery.  To L&D for indxn. BPP 8/8 today with normal fluid

## 2019-12-05 NOTE — DISCHARGE INSTRUCTIONS
Call clinic [phone] or L & D after hours at [phone] for vaginal bleeding, leakage of fluids, regular contractions every 5 mins for 2 hours, decreased fetal movements ( 10 kicks in 2 hours), headache not relieved by Tylenol, blurry vision, or temp of 100.4 or greater.  Begin doing fetal kick counts, at least 10 movements in 2 hours starting at 28 weeks gestation.  Keep next clinic appointment.     Adapting to Pregnancy: Third Trimester    Although common during pregnancy, some discomforts may seem worse in the final weeks. Simple lifestyle changes can help. Take care of yourself. And ask your partner to help out with small tasks.  Limiting leg problems  Ways to combat leg issues:  · Wear support hose all day.  · Avoid snug shoes and clothes that bind, like tight pants and socks with elastic tops.  · Sit with your feet and legs raised often.  Caring for your breasts  Tips to follow include:  · Wash with plain water. Avoid using harsh soaps or rubbing alcohol. They may cause dryness.  · Wear a nursing bra for extra support. It can also hide any leaks from your nipples.  Controlling hemorrhoids  Ways to avoid hemorrhoids include:  · Eat foods that are high in fiber. Also, exercise and drink enough fluids. This will reduce constipation and hemorrhoids.  · Sleep and nap on your side. This limits pressure on the veins of your rectum.  · Try not to stand or sit for long periods.  Controlling back pain  As your body changes during pregnancy, your back must work in new ways. Back pain is due to many causes. Physical changes in your body can strain your back and its supporting muscles. Also, hormones (chemicals that carry messages throughout the body) increase during pregnancy. This can affect how your muscles and joints work together. All of these changes can lead to pain. Pain may be felt in the upper or lower back. Pain is also common in the pelvis. Some pregnant women have sciatica. This is pain caused by pressure on the  sciatic nerve running down the back of the leg. Ask your healthcare provider for specific tips and exercises to help control your back pain.  Tips to help you rest  Good rest and sleep will help you feel better. Here are some ideas:  · Ask your partner to massage your shoulders, neck, or back.  · Limit the errands you do each day.  · Lie down in the afternoon or after work for a few minutes.  · Take a warm bath before you go to sleep.  · Drink warm milk or teas without caffeine.  · Avoid coffee, black tea, and cola.  Stopping heartburn  · Avoid spicy or acidic foods.  · Eat small amounts more often. Eat slowly. · Wait 2 hours after eating before lying down.  · Sleep with your upper body raised 6 inches.   Managing mood swings  Ways to manage mood swings include:  · Know that mood changes are normal.  · Exercise often, but get plenty of rest.  · Address any concerns and limit stress. Talking to your partner, other women, or your healthcare provider may help.  Dealing with urinary frequency  Tips to deal with having to urinate often include:  · Drink plenty of water all day. If you drink a lot in the evening, though, you may have to get up more in the night.  · Limit coffee, black tea, and cola.  Date Last Reviewed: 8/16/2015 © 2000-2017 "[employer]. [address] [place] [address], [place] [address]. All rights reserved. This information is not intended as a substitute for professional medical care. Always follow your healthcare professional's instructions.          Pregnancy: Your Third Trimester Changes  As the baby grows, your body changes too. You may also see signs that your body is getting ready for labor. Be patient. Within a few more weeks, your baby will be born.    How you are changing  Your body is preparing for the birth of your baby. Some of the most common changes are listed below. If you have any questions or concerns, ask your healthcare provider:  · Youll gain more weight from fluids, extra  blood, and fat deposits.  · Your breasts will grow as your body gets ready to feed the baby. They may be more tender. You may also notice a slight yellow or white discharge from the nipples.  · Discharge from your vagina may increase. This is normal.  · You might see some skin color changes on your forehead, cheeks, or nose. Most of these will go away after you deliver.  How your baby is growing    Month 7  Your baby can open and close his or her eyes, and weighs around 4 pounds. If born prematurely (too early), your baby would likely survive with special care.   Month 8  Your baby is building up body fat, and weighs around 6 pounds.    Month 9  Your baby weighs nearly 7 pounds and is about 18 to 20 inches long. In other words, any day now...   Date Last Reviewed: 8/16/2015  © 3262-0191 [employer]. [address] [place] [address], [place] [address]. All rights reserved. This information is not intended as a substitute for professional medical care. Always follow your healthcare professional's instructions.

## 2019-12-05 NOTE — ED NOTES
Pt discharged to home with self care.  at bedside. Discharge instructions reviewed as well as S/S that warrant return to OB ED. Pt and  verbalized understanding. Pt aware of scheduled indux 12/10 @ 0400.

## 2019-12-10 ENCOUNTER — ANESTHESIA EVENT (OUTPATIENT)
Dept: OBSTETRICS AND GYNECOLOGY | Facility: OTHER | Age: 39
End: 2019-12-10
Payer: COMMERCIAL

## 2019-12-10 ENCOUNTER — ANESTHESIA (OUTPATIENT)
Dept: OBSTETRICS AND GYNECOLOGY | Facility: OTHER | Age: 39
End: 2019-12-10
Payer: COMMERCIAL

## 2019-12-10 ENCOUNTER — HOSPITAL ENCOUNTER (INPATIENT)
Facility: OTHER | Age: 39
LOS: 2 days | Discharge: HOME | End: 2019-12-12
Attending: OBSTETRICS & GYNECOLOGY | Admitting: OBSTETRICS & GYNECOLOGY
Payer: COMMERCIAL

## 2019-12-10 DIAGNOSIS — O09.523 ELDERLY MULTIGRAVIDA IN THIRD TRIMESTER: ICD-10-CM

## 2019-12-10 DIAGNOSIS — O43.123 VELAMENTOUS INSERTION OF UMBILICAL CORD IN THIRD TRIMESTER: ICD-10-CM

## 2019-12-10 DIAGNOSIS — O09.523 MULTIGRAVIDA OF ADVANCED MATERNAL AGE IN THIRD TRIMESTER: ICD-10-CM

## 2019-12-10 DIAGNOSIS — Z34.80 SUPERVISION OF OTHER NORMAL PREGNANCY, ANTEPARTUM: ICD-10-CM

## 2019-12-10 LAB
ABO + RH BLD: NORMAL
ALBUMIN SERPL BCP-MCNC: 2.6 G/DL (ref 3.5–5.2)
ALP SERPL-CCNC: 247 U/L (ref 55–135)
ALT SERPL W/O P-5'-P-CCNC: 67 U/L (ref 10–44)
ANION GAP SERPL CALC-SCNC: 9 MMOL/L (ref 8–16)
AST SERPL-CCNC: 58 U/L (ref 10–40)
BASOPHILS # BLD AUTO: 0.02 K/UL (ref 0–0.2)
BASOPHILS # BLD AUTO: 0.02 K/UL (ref 0–0.2)
BASOPHILS NFR BLD: 0.2 % (ref 0–1.9)
BASOPHILS NFR BLD: 0.3 % (ref 0–1.9)
BILIRUB SERPL-MCNC: 0.8 MG/DL (ref 0.1–1)
BLD GP AB SCN CELLS X3 SERPL QL: NORMAL
BUN SERPL-MCNC: 9 MG/DL (ref 6–20)
CALCIUM SERPL-MCNC: 8.8 MG/DL (ref 8.7–10.5)
CHLORIDE SERPL-SCNC: 107 MMOL/L (ref 95–110)
CO2 SERPL-SCNC: 21 MMOL/L (ref 23–29)
CREAT SERPL-MCNC: 0.6 MG/DL (ref 0.5–1.4)
DIFFERENTIAL METHOD: ABNORMAL
DIFFERENTIAL METHOD: ABNORMAL
EOSINOPHIL # BLD AUTO: 0.1 K/UL (ref 0–0.5)
EOSINOPHIL # BLD AUTO: 0.2 K/UL (ref 0–0.5)
EOSINOPHIL NFR BLD: 1.2 % (ref 0–8)
EOSINOPHIL NFR BLD: 2.1 % (ref 0–8)
ERYTHROCYTE [DISTWIDTH] IN BLOOD BY AUTOMATED COUNT: 13.2 % (ref 11.5–14.5)
ERYTHROCYTE [DISTWIDTH] IN BLOOD BY AUTOMATED COUNT: 13.4 % (ref 11.5–14.5)
EST. GFR  (AFRICAN AMERICAN): >60 ML/MIN/1.73 M^2
EST. GFR  (NON AFRICAN AMERICAN): >60 ML/MIN/1.73 M^2
GLUCOSE SERPL-MCNC: 96 MG/DL (ref 70–110)
HCT VFR BLD AUTO: 33.3 % (ref 37–48.5)
HCT VFR BLD AUTO: 37.3 % (ref 37–48.5)
HGB BLD-MCNC: 11 G/DL (ref 12–16)
HGB BLD-MCNC: 12.4 G/DL (ref 12–16)
HIV 1+2 AB+HIV1 P24 AG SERPL QL IA: NEGATIVE
IMM GRANULOCYTES # BLD AUTO: 0.07 K/UL (ref 0–0.04)
IMM GRANULOCYTES # BLD AUTO: 0.08 K/UL (ref 0–0.04)
IMM GRANULOCYTES NFR BLD AUTO: 0.6 % (ref 0–0.5)
IMM GRANULOCYTES NFR BLD AUTO: 1.1 % (ref 0–0.5)
LYMPHOCYTES # BLD AUTO: 1.4 K/UL (ref 1–4.8)
LYMPHOCYTES # BLD AUTO: 1.7 K/UL (ref 1–4.8)
LYMPHOCYTES NFR BLD: 11.7 % (ref 18–48)
LYMPHOCYTES NFR BLD: 23.9 % (ref 18–48)
MCH RBC QN AUTO: 30.2 PG (ref 27–31)
MCH RBC QN AUTO: 30.4 PG (ref 27–31)
MCHC RBC AUTO-ENTMCNC: 33 G/DL (ref 32–36)
MCHC RBC AUTO-ENTMCNC: 33.2 G/DL (ref 32–36)
MCV RBC AUTO: 91 FL (ref 82–98)
MCV RBC AUTO: 92 FL (ref 82–98)
MONOCYTES # BLD AUTO: 0.6 K/UL (ref 0.3–1)
MONOCYTES # BLD AUTO: 0.7 K/UL (ref 0.3–1)
MONOCYTES NFR BLD: 6.3 % (ref 4–15)
MONOCYTES NFR BLD: 7.7 % (ref 4–15)
NEUTROPHILS # BLD AUTO: 4.7 K/UL (ref 1.8–7.7)
NEUTROPHILS # BLD AUTO: 9.3 K/UL (ref 1.8–7.7)
NEUTROPHILS NFR BLD: 64.9 % (ref 38–73)
NEUTROPHILS NFR BLD: 80 % (ref 38–73)
NRBC BLD-RTO: 0 /100 WBC
NRBC BLD-RTO: 0 /100 WBC
PLATELET # BLD AUTO: 189 K/UL (ref 150–350)
PLATELET # BLD AUTO: 217 K/UL (ref 150–350)
PMV BLD AUTO: 11.1 FL (ref 9.2–12.9)
PMV BLD AUTO: 11.5 FL (ref 9.2–12.9)
POTASSIUM SERPL-SCNC: 3.7 MMOL/L (ref 3.5–5.1)
PROT SERPL-MCNC: 6.2 G/DL (ref 6–8.4)
RBC # BLD AUTO: 3.64 M/UL (ref 4–5.4)
RBC # BLD AUTO: 4.08 M/UL (ref 4–5.4)
SODIUM SERPL-SCNC: 137 MMOL/L (ref 136–145)
WBC # BLD AUTO: 11.56 K/UL (ref 3.9–12.7)
WBC # BLD AUTO: 7.27 K/UL (ref 3.9–12.7)

## 2019-12-10 PROCEDURE — 59400 OBSTETRICAL CARE: CPT | Mod: ,,, | Performed by: OBSTETRICS & GYNECOLOGY

## 2019-12-10 PROCEDURE — 51702 INSERT TEMP BLADDER CATH: CPT

## 2019-12-10 PROCEDURE — 59400 PR FULL ROUT OBSTE CARE,VAGINAL DELIV: ICD-10-PCS | Mod: ,,, | Performed by: OBSTETRICS & GYNECOLOGY

## 2019-12-10 PROCEDURE — 25000003 PHARM REV CODE 250: Performed by: STUDENT IN AN ORGANIZED HEALTH CARE EDUCATION/TRAINING PROGRAM

## 2019-12-10 PROCEDURE — 36415 COLL VENOUS BLD VENIPUNCTURE: CPT

## 2019-12-10 PROCEDURE — 86703 HIV-1/HIV-2 1 RESULT ANTBDY: CPT

## 2019-12-10 PROCEDURE — 0502F SUBSEQUENT PRENATAL CARE: CPT | Mod: ,,, | Performed by: OBSTETRICS & GYNECOLOGY

## 2019-12-10 PROCEDURE — 80053 COMPREHEN METABOLIC PANEL: CPT

## 2019-12-10 PROCEDURE — 86762 RUBELLA ANTIBODY: CPT

## 2019-12-10 PROCEDURE — 27200710 HC EPIDURAL INFUSION PUMP SET: Performed by: STUDENT IN AN ORGANIZED HEALTH CARE EDUCATION/TRAINING PROGRAM

## 2019-12-10 PROCEDURE — 72200005 HC VAGINAL DELIVERY LEVEL II

## 2019-12-10 PROCEDURE — 0502F PR SUBSEQUENT PRENATAL CARE: ICD-10-PCS | Mod: ,,, | Performed by: OBSTETRICS & GYNECOLOGY

## 2019-12-10 PROCEDURE — 25000003 PHARM REV CODE 250: Performed by: OBSTETRICS & GYNECOLOGY

## 2019-12-10 PROCEDURE — 59400 OBSTETRICAL CARE: CPT | Mod: QY,,, | Performed by: ANESTHESIOLOGY

## 2019-12-10 PROCEDURE — 86850 RBC ANTIBODY SCREEN: CPT

## 2019-12-10 PROCEDURE — 11000001 HC ACUTE MED/SURG PRIVATE ROOM

## 2019-12-10 PROCEDURE — 85025 COMPLETE CBC W/AUTO DIFF WBC: CPT

## 2019-12-10 PROCEDURE — 63600175 PHARM REV CODE 636 W HCPCS: Performed by: OBSTETRICS & GYNECOLOGY

## 2019-12-10 PROCEDURE — 86592 SYPHILIS TEST NON-TREP QUAL: CPT

## 2019-12-10 PROCEDURE — 62326 NJX INTERLAMINAR LMBR/SAC: CPT | Performed by: STUDENT IN AN ORGANIZED HEALTH CARE EDUCATION/TRAINING PROGRAM

## 2019-12-10 PROCEDURE — C1751 CATH, INF, PER/CENT/MIDLINE: HCPCS | Performed by: STUDENT IN AN ORGANIZED HEALTH CARE EDUCATION/TRAINING PROGRAM

## 2019-12-10 PROCEDURE — 59400 PRA FULL ROUT OBSTE CARE,VAGINAL DELIV: ICD-10-PCS | Mod: QY,,, | Performed by: ANESTHESIOLOGY

## 2019-12-10 RX ORDER — SIMETHICONE 80 MG
1 TABLET,CHEWABLE ORAL 4 TIMES DAILY PRN
Status: DISCONTINUED | OUTPATIENT
Start: 2019-12-10 | End: 2019-12-10 | Stop reason: SDUPTHER

## 2019-12-10 RX ORDER — FENTANYL/BUPIVACAINE/NS/PF 2MCG/ML-.1
PLASTIC BAG, INJECTION (ML) INJECTION
Status: DISPENSED
Start: 2019-12-10 | End: 2019-12-10

## 2019-12-10 RX ORDER — OXYTOCIN/RINGER'S LACTATE 30/500 ML
95 PLASTIC BAG, INJECTION (ML) INTRAVENOUS ONCE
Status: COMPLETED | OUTPATIENT
Start: 2019-12-10 | End: 2019-12-10

## 2019-12-10 RX ORDER — DIPHENHYDRAMINE HCL 25 MG
25 CAPSULE ORAL EVERY 4 HOURS PRN
Status: DISCONTINUED | OUTPATIENT
Start: 2019-12-10 | End: 2019-12-12 | Stop reason: HOSPADM

## 2019-12-10 RX ORDER — CALCIUM CARBONATE 200(500)MG
500 TABLET,CHEWABLE ORAL 3 TIMES DAILY PRN
Status: DISCONTINUED | OUTPATIENT
Start: 2019-12-10 | End: 2019-12-10

## 2019-12-10 RX ORDER — SIMETHICONE 80 MG
1 TABLET,CHEWABLE ORAL EVERY 6 HOURS PRN
Status: DISCONTINUED | OUTPATIENT
Start: 2019-12-10 | End: 2019-12-12 | Stop reason: HOSPADM

## 2019-12-10 RX ORDER — OXYTOCIN/RINGER'S LACTATE 30/500 ML
95 PLASTIC BAG, INJECTION (ML) INTRAVENOUS ONCE
Status: DISCONTINUED | OUTPATIENT
Start: 2019-12-10 | End: 2019-12-10

## 2019-12-10 RX ORDER — OXYCODONE AND ACETAMINOPHEN 5; 325 MG/1; MG/1
1 TABLET ORAL EVERY 4 HOURS PRN
Status: DISCONTINUED | OUTPATIENT
Start: 2019-12-10 | End: 2019-12-12 | Stop reason: HOSPADM

## 2019-12-10 RX ORDER — DIPHENOXYLATE HYDROCHLORIDE AND ATROPINE SULFATE 2.5; .025 MG/1; MG/1
1 TABLET ORAL 4 TIMES DAILY PRN
Status: DISCONTINUED | OUTPATIENT
Start: 2019-12-10 | End: 2019-12-10

## 2019-12-10 RX ORDER — ACETAMINOPHEN 325 MG/1
650 TABLET ORAL EVERY 6 HOURS PRN
Status: DISCONTINUED | OUTPATIENT
Start: 2019-12-10 | End: 2019-12-12 | Stop reason: HOSPADM

## 2019-12-10 RX ORDER — PRENATAL WITH FERROUS FUM AND FOLIC ACID 3080; 920; 120; 400; 22; 1.84; 3; 20; 10; 1; 12; 200; 27; 25; 2 [IU]/1; [IU]/1; MG/1; [IU]/1; MG/1; MG/1; MG/1; MG/1; MG/1; MG/1; UG/1; MG/1; MG/1; MG/1; MG/1
1 TABLET ORAL DAILY
Status: DISCONTINUED | OUTPATIENT
Start: 2019-12-10 | End: 2019-12-12 | Stop reason: HOSPADM

## 2019-12-10 RX ORDER — OXYTOCIN/RINGER'S LACTATE 30/500 ML
334 PLASTIC BAG, INJECTION (ML) INTRAVENOUS ONCE
Status: COMPLETED | OUTPATIENT
Start: 2019-12-10 | End: 2019-12-10

## 2019-12-10 RX ORDER — IBUPROFEN 600 MG/1
600 TABLET ORAL EVERY 6 HOURS PRN
Status: DISCONTINUED | OUTPATIENT
Start: 2019-12-10 | End: 2019-12-12 | Stop reason: HOSPADM

## 2019-12-10 RX ORDER — OXYCODONE AND ACETAMINOPHEN 10; 325 MG/1; MG/1
1 TABLET ORAL EVERY 4 HOURS PRN
Status: DISCONTINUED | OUTPATIENT
Start: 2019-12-10 | End: 2019-12-12 | Stop reason: HOSPADM

## 2019-12-10 RX ORDER — TERBUTALINE SULFATE 1 MG/ML
0.25 INJECTION SUBCUTANEOUS
Status: DISCONTINUED | OUTPATIENT
Start: 2019-12-10 | End: 2019-12-10

## 2019-12-10 RX ORDER — ONDANSETRON 8 MG/1
8 TABLET, ORALLY DISINTEGRATING ORAL EVERY 8 HOURS PRN
Status: DISCONTINUED | OUTPATIENT
Start: 2019-12-10 | End: 2019-12-12 | Stop reason: HOSPADM

## 2019-12-10 RX ORDER — LIDOCAINE HYDROCHLORIDE AND EPINEPHRINE 15; 5 MG/ML; UG/ML
INJECTION, SOLUTION EPIDURAL
Status: DISCONTINUED | OUTPATIENT
Start: 2019-12-10 | End: 2019-12-10

## 2019-12-10 RX ORDER — MISOPROSTOL 200 UG/1
800 TABLET ORAL
Status: DISCONTINUED | OUTPATIENT
Start: 2019-12-10 | End: 2019-12-10

## 2019-12-10 RX ORDER — ONDANSETRON 8 MG/1
8 TABLET, ORALLY DISINTEGRATING ORAL EVERY 8 HOURS PRN
Status: DISCONTINUED | OUTPATIENT
Start: 2019-12-10 | End: 2019-12-10 | Stop reason: SDUPTHER

## 2019-12-10 RX ORDER — DOCUSATE SODIUM 100 MG/1
200 CAPSULE, LIQUID FILLED ORAL 2 TIMES DAILY PRN
Status: DISCONTINUED | OUTPATIENT
Start: 2019-12-10 | End: 2019-12-12 | Stop reason: HOSPADM

## 2019-12-10 RX ORDER — DIPHENHYDRAMINE HYDROCHLORIDE 50 MG/ML
25 INJECTION INTRAMUSCULAR; INTRAVENOUS EVERY 4 HOURS PRN
Status: DISCONTINUED | OUTPATIENT
Start: 2019-12-10 | End: 2019-12-12 | Stop reason: HOSPADM

## 2019-12-10 RX ORDER — SODIUM CHLORIDE 9 MG/ML
INJECTION, SOLUTION INTRAVENOUS
Status: DISCONTINUED | OUTPATIENT
Start: 2019-12-10 | End: 2019-12-10

## 2019-12-10 RX ORDER — SODIUM CHLORIDE, SODIUM LACTATE, POTASSIUM CHLORIDE, CALCIUM CHLORIDE 600; 310; 30; 20 MG/100ML; MG/100ML; MG/100ML; MG/100ML
INJECTION, SOLUTION INTRAVENOUS CONTINUOUS
Status: DISCONTINUED | OUTPATIENT
Start: 2019-12-10 | End: 2019-12-10

## 2019-12-10 RX ORDER — HYDROCORTISONE 25 MG/G
CREAM TOPICAL 3 TIMES DAILY PRN
Status: DISCONTINUED | OUTPATIENT
Start: 2019-12-10 | End: 2019-12-12 | Stop reason: HOSPADM

## 2019-12-10 RX ORDER — SODIUM CITRATE AND CITRIC ACID MONOHYDRATE 334; 500 MG/5ML; MG/5ML
30 SOLUTION ORAL ONCE
Status: DISCONTINUED | OUTPATIENT
Start: 2019-12-10 | End: 2019-12-10

## 2019-12-10 RX ORDER — OXYTOCIN/RINGER'S LACTATE 30/500 ML
95 PLASTIC BAG, INJECTION (ML) INTRAVENOUS ONCE
Status: DISCONTINUED | OUTPATIENT
Start: 2019-12-10 | End: 2019-12-12 | Stop reason: HOSPADM

## 2019-12-10 RX ORDER — FAMOTIDINE 10 MG/ML
20 INJECTION INTRAVENOUS ONCE
Status: DISCONTINUED | OUTPATIENT
Start: 2019-12-10 | End: 2019-12-10

## 2019-12-10 RX ORDER — FENTANYL/BUPIVACAINE/NS/PF 2MCG/ML-.1
PLASTIC BAG, INJECTION (ML) INJECTION CONTINUOUS PRN
Status: DISCONTINUED | OUTPATIENT
Start: 2019-12-10 | End: 2019-12-10

## 2019-12-10 RX ORDER — IBUPROFEN 600 MG/1
600 TABLET ORAL EVERY 6 HOURS PRN
Qty: 40 TABLET | Refills: 0 | Status: SHIPPED | OUTPATIENT
Start: 2019-12-10 | End: 2020-02-06

## 2019-12-10 RX ORDER — CARBOPROST TROMETHAMINE 250 UG/ML
250 INJECTION, SOLUTION INTRAMUSCULAR
Status: DISCONTINUED | OUTPATIENT
Start: 2019-12-10 | End: 2019-12-10

## 2019-12-10 RX ORDER — FENTANYL/BUPIVACAINE/NS/PF 2MCG/ML-.1
PLASTIC BAG, INJECTION (ML) INJECTION CONTINUOUS
Status: DISCONTINUED | OUTPATIENT
Start: 2019-12-10 | End: 2019-12-10

## 2019-12-10 RX ORDER — OXYCODONE AND ACETAMINOPHEN 5; 325 MG/1; MG/1
1 TABLET ORAL EVERY 4 HOURS PRN
Qty: 20 TABLET | Refills: 0 | Status: SHIPPED | OUTPATIENT
Start: 2019-12-10 | End: 2020-02-06

## 2019-12-10 RX ORDER — METHYLERGONOVINE MALEATE 0.2 MG/ML
200 INJECTION INTRAVENOUS
Status: DISCONTINUED | OUTPATIENT
Start: 2019-12-10 | End: 2019-12-10

## 2019-12-10 RX ADMIN — IBUPROFEN 600 MG: 600 TABLET, FILM COATED ORAL at 07:12

## 2019-12-10 RX ADMIN — LIDOCAINE HYDROCHLORIDE,EPINEPHRINE BITARTRATE 3 ML: 15; .005 INJECTION, SOLUTION EPIDURAL; INFILTRATION; INTRACAUDAL; PERINEURAL at 01:12

## 2019-12-10 RX ADMIN — Medication 95 MILLI-UNITS/MIN: at 05:12

## 2019-12-10 RX ADMIN — IBUPROFEN 600 MG: 600 TABLET, FILM COATED ORAL at 01:12

## 2019-12-10 RX ADMIN — Medication 10 ML/HR: at 01:12

## 2019-12-10 RX ADMIN — Medication 334 MILLI-UNITS/MIN: at 04:12

## 2019-12-10 NOTE — ANESTHESIA PROCEDURE NOTES
Epidural    Patient location during procedure: OB   Reason for block: primary anesthetic   Diagnosis: IUP   Start time: 12/10/2019 1:02 AM  Timeout: 12/10/2019 1:00 AM  End time: 12/10/2019 1:07 AM  Surgery related to: Vaginal Delivery    Staffing  Performing Provider: Erick Logan MD  Authorizing Provider: Ginger Cruz MD        Preanesthetic Checklist  Completed: patient identified, site marked, surgical consent, pre-op evaluation, timeout performed, IV checked, risks and benefits discussed, monitors and equipment checked, anesthesia consent given, hand hygiene performed and patient being monitored  Preparation  Patient position: sitting  Prep: ChloraPrep  Patient monitoring: Pulse Ox  Epidural  Skin Anesthetic: lidocaine 1%  Skin Wheal: 3 mL  Administration type: continuous  Approach: midline  Interspace: L3-4    Injection technique: EBONY saline  Needle and Epidural Catheter  Needle type: Tuohy   Needle gauge: 17  Needle length: 3.5 inches  Needle insertion depth: 4 cm  Catheter type: springwound  Catheter size: 19 G  Catheter at skin depth: 9 cm  Test dose: 3 mL of lidocaine 1.5% with Epi 1-to-200,000  Additional Documentation: incremental injection, negative aspiration for heme and CSF, no paresthesia on injection, no signs/symptoms of IV or SA injection, no significant pain on injection and no significant complaints from patient  Needle localization: anatomical landmarks  Medications:  Volume per aspiration: 5 mL  Time between aspirations: 5 minutes  Assessment  Ease of block: easy  Patient's tolerance of the procedure: comfortable throughout block and no complaintsNo inadvertent dural puncture with Tuohy.  Dural puncture performed with spinal needle.

## 2019-12-10 NOTE — INTERVAL H&P NOTE
The patient has been examined and the H&P has been reviewed:    I concur with the findings and changes have been noted since the H&P was written: SROM at home, SVE 7/90/0 at 0015. NST reactive/reassuring        Active Hospital Problems    Diagnosis  POA    Supervision of other normal pregnancy, antepartum [Z34.80]  Not Applicable      Resolved Hospital Problems   No resolved problems to display.

## 2019-12-10 NOTE — DISCHARGE INSTRUCTIONS
Breastfeeding Discharge Instructions       Feed the baby at the earliest sign of hunger or comfort  o Hands to mouth, sucking motions  o Rooting or searching for something to suck on  o Dont wait for crying - it is a sign of distress     The feedings may be 8-12 times per 24hrs and will not follow a schedule   Avoid pacifiers and bottles for the first 4 weeks   Alternate the breast you start the feeding with, or start with the breast that feels the fullest   Switch breasts when the baby takes himself off the breast or falls asleep   Keep offering breasts until the baby looks full, no longer gives hunger signs, and stays asleep when placed on his back in the crib   If the baby is sleepy and wont wake for a feeding, put the baby skin-to-skin dressed in a diaper against the mothers bare chest   Sleep near your baby   The baby should be positioned and latched on to the breast correctly  o Chest-to-chest, chin in the breast  o Babys lips are flipped outward  o Babys mouth is stretched open wide like a shout  o Babys sucking should feel like tugging to the mother  - The baby should be drinking at the breast:  o You should hear swallowing or gulping throughout the feeding  o You should see milk on the babys lips when he comes off the breast  o Your breasts should be softer when the baby is finished feeding  o The baby should look relaxed at the end of feedings  o After the 4th day and your milk is in:  o The babys poop should turn bright yellow and be loose, watery, and seedy  o The baby should have at least 3-4 poops the size of the palm of your hand per day  o The baby should have at least 5-6 wet diapers per day  o The urine should be light yellow in color  You should drink when you are thirsty and eat a healthy diet when you are    hungry.     Take naps to get the rest you need.   Take medications and/or drink alcohol only with permission of your obstetrician    or the babys pediatrician.  You can  also call the [facility],   ([phone]), Monday-Friday, 8am-5pm Central time, to get the most   up-to-date evidence-based information on the use of medications during   pregnancy and breastfeeding.      The baby should be examined by a pediatrician at 3-5 days of age.  Once your   milk comes in, the baby should be gaining at least ½ - 1oz each day and should be back to birthweight no later than 10-14 days of age.          Community Resources    [facility] Breastfeeding Warmline: [phone]   Local [facility] clinics: provide incentives and breastpumps to eligible mothers  [employer]he [employer] (LLLI):  mother-to-mother support group website        www.[employer]  Local La Leche League mother-to-mother support groups:        www.[employer]        La Leche League [facility]   Dr. Carlos Hernandez website for latch videos and general information:        www.breastfeedinginc.ca  [facility] is a call center that provides information about the safety of taking medications while breastfeeding.  Call [phone], M-F, 8am-5pm, CT.  International Lactation Consultant Association provides resources for assistance:        www.ilca.org  [place] Breastfeeding Coalition provides informationand resources for parents  and the community    www.LaBreastfeedingSupport.org     Nohelia Smith is a mom-to-mom support group:                             www.[employer].com//breastfeedng-support/  Partners for Healthy Babies:  8-667-682-BABY(7502)  C[employer]: a breastfeeding support group for women of color, [phone]

## 2019-12-10 NOTE — LACTATION NOTE
Lactation note:    LC to room. Pt sleep interrupted. Encouraged pt to call LC for next feeding for latch assistance.  LC number on board, told pt what time LC leaves floor.

## 2019-12-10 NOTE — ANESTHESIA PREPROCEDURE EVALUATION
Rina Hurtado is a 38 y.o. female  @40w4d presents in labor. No pmhx    OB History    Para Term  AB Living   3 1 1   1 1   SAB TAB Ectopic Multiple Live Births   1       1      # Outcome Date GA Lbr Alexis/2nd Weight Sex Delivery Anes PTL Lv   3 Current            2 Term 12/08/15 41w0d  3.374 kg (7 lb 7 oz) F Vag-Spont None N LUCIA      Birth Comments: NCB, labor with pitocin augmentation   1 SAB  9w0d             Birth Comments: SAB -- No D&C       Wt Readings from Last 1 Encounters:   19 0913 76 kg (167 lb 8.8 oz)       BP Readings from Last 3 Encounters:   12/10/19 (!) 108/59   19 (!) 101/53   19 100/66       Patient Active Problem List   Diagnosis    Velamentous insertion of umbilical cord in third trimester    Elderly multigravida in third trimester       No past surgical history on file.    Social History     Socioeconomic History    Marital status:      Spouse name: Not on file    Number of children: Not on file    Years of education: Not on file    Highest education level: Not on file   Occupational History    Not on file   Social Needs    Financial resource strain: Not on file    Food insecurity:     Worry: Not on file     Inability: Not on file    Transportation needs:     Medical: Not on file     Non-medical: Not on file   Tobacco Use    Smoking status: Former Smoker    Smokeless tobacco: Never Used   Substance and Sexual Activity    Alcohol use: Not Currently    Drug use: Never    Sexual activity: Yes     Partners: Male     Birth control/protection: None   Lifestyle    Physical activity:     Days per week: Not on file     Minutes per session: Not on file    Stress: Not on file   Relationships    Social connections:     Talks on phone: Not on file     Gets together: Not on file     Attends [employer] service: Not on file     Active member of club or organization: Not on file     Attends meetings of clubs or organizations: Not on file      Relationship status: Not on file   Other Topics Concern    Not on file   Social History Narrative    Not on file         Chemistry        Component Value Date/Time    GLU 90 05/01/2019    No results found for: CALCIUM, ALKPHOS, AST, ALT, BILITOT, ESTGFRAFRICA, EGFRNONAA         Lab Results   Component Value Date    WBC 8.00 09/18/2019    HGB 11.1 (L) 09/18/2019    HCT 33.2 (L) 09/18/2019    MCV 93 09/18/2019     09/18/2019       No results for input(s): PT, INR, PROTIME, APTT in the last 72 hours.                Anesthesia Evaluation    I have reviewed the Patient Summary Reports.    I have reviewed the Nursing Notes.   I have reviewed the Medications.     Review of Systems  Anesthesia Hx:  History of prior surgery of interest to airway management or planning: Denies Family Hx of Anesthesia complications.   Denies Personal Hx of Anesthesia complications.   Social:  Non-Smoker    Cardiovascular:   Denies MI.  Denies CAD.    Denies CABG/stent.  Denies Dysrhythmias.   Denies Angina.    Pulmonary:   Denies Pneumonia Denies COPD.  Denies Asthma.  Denies Shortness of breath.    Renal/:   Denies Chronic Renal Disease.     Hepatic/GI:   Denies Liver Disease.    Neurological:   Denies CVA. Denies Seizures.        Physical Exam  General:  Well nourished    Airway/Jaw/Neck:  Airway Findings: Mouth Opening: Normal Tongue: Normal  Mallampati: I        Eyes/Ears/Nose:  EYES/EARS/NOSE FINDINGS: Normal   Dental:  DENTAL FINDINGS: Normal   Chest/Lungs:  Chest/Lungs Findings: Clear to auscultation, Normal Respiratory Rate     Heart/Vascular:  Heart Findings: Rate: Normal  Rhythm: Regular Rhythm  Sounds: Normal     Abdomen:  Abdomen Findings: Normal    Musculoskeletal:  Musculoskeletal Findings: Normal   Skin:  Skin Findings: Normal    Mental Status:  Mental Status Findings: Normal        Anesthesia Plan  Type of Anesthesia, risks & benefits discussed:  Anesthesia Type:  epidural  Patient's Preference:   Intra-op Monitoring  Plan: standard ASA monitors  Intra-op Monitoring Plan Comments:   Post Op Pain Control Plan: per primary service following discharge from PACU  Post Op Pain Control Plan Comments:   Induction:   IV  Beta Blocker:  Patient is not currently on a Beta-Blocker (No further documentation required).       Informed Consent: Patient understands risks and agrees with Anesthesia plan.  Questions answered. Anesthesia consent signed with patient.  ASA Score: 2     Day of Surgery Review of History & Physical:    H&P update referred to the provider.         Ready For Surgery From Anesthesia Perspective.

## 2019-12-10 NOTE — L&D DELIVERY NOTE
[facility]  Vaginal Delivery   Operative Note    SUMMARY     Normal spontaneous vaginal delivery of live infant, was placed on mothers abdomen for skin to skin and bulb suctioning performed.  Infant delivered position OA over intact perineum.  Nuchal cord: No.    Spontaneous delivery of placenta and IV pitocin given noting good uterine tone.  1st degree laceration noted.  Patient tolerated delivery well. Sponge needle and lap counted correctly x2.    Indications: Vaginal delivery  Pregnancy complicated by:   Patient Active Problem List   Diagnosis    Velamentous insertion of umbilical cord in third trimester    Elderly multigravida in third trimester    Supervision of other normal pregnancy, antepartum    Vaginal delivery    Multigravida of advanced maternal age in third trimester     Admitting GA: 40w4d    Delivery Information for Phuc Hurtado    Birth information:  YOB: 2021   Time of birth: 4:48 AM   Sex: male   Head Delivery Date/Time: 12/10/2019  4:48 AM   Delivery type: Vaginal, Spontaneous   Gestational Age: 40w4d    Delivery Providers    Delivering clinician:  Aster Larkin MD   Provider Role    SHAZIA Sawant RN Callie R. Crouch,              Measurements    Weight:  3742 g  Length:  54.6 cm  Head circumference:  35.6 cm  Chest circumference:  34.3 cm         Apgars    Living status:  Living  Apgars:   1 min.:   5 min.:   10 min.:   15 min.:   20 min.:     Skin color:   1  1       Heart rate:   2  2       Reflex irritability:   2  2       Muscle tone:   2  2       Respiratory effort:   2  2       Total:   9  9       Apgars assigned by:  MARIA ELENA GUERRERO         Operative Delivery    Forceps attempted?:  No  Vacuum extractor attempted?:  No         Shoulder Dystocia    Shoulder dystocia present?:  No           Presentation    Presentation:  Vertex  Position:  Left Occiput Anterior           Interventions/Resuscitation    Method:   NICU Attended       Cord    Vessels:  3 vessels  Complications:  None  Delayed Cord Clamping?:  Yes  Cord Clamped Date/Time:  12/10/2019  4:50 AM  Cord Blood Disposition:  Sent with Baby  Gases Sent?:  No  Stem Cell Collection (by MD):  No       Placenta    Placenta delivery date/time:  12/10/2019 0452  Placenta removal:  Spontaneous  Placenta appearance:  Intact  Placenta disposition:  discarded           Labor Events:       labor: No     Labor Onset Date/Time:         Dilation Complete Date/Time: 12/10/2019 04:30     Start Pushing Date/Time:         Start Pushing Date/Time:       Rupture Date/Time:              Rupture type:           Fluid Amount:        Fluid Color:        Fluid Odor:        Membrane Status:                 steroids: None     Antibiotics given for GBS: No     Induction: none     Indications for induction:        Augmentation: oxytocin     Indications for augmentation: Ineffective Contraction Pattern     Labor complications: None     Additional complications:          Cervical ripening:                     Delivery:      Episiotomy: None     Indication for Episiotomy:       Perineal Lacerations: 1st Repaired:  Yes   Periurethral Laceration:   Repaired:     Labial Laceration:   Repaired:     Sulcus Laceration:   Repaired:     Vaginal Laceration:   Repaired:     Cervical Laceration:   Repaired:     Repair suture:       Repair # of packets: 1     Last Value - EBL - Nursing (mL): 300     Sum - EBL - Nursing (mL): 300     Last Value - EBL - Anesthesia (mL):      Calculated QBL (mL):        Vaginal Sweep Performed: Yes     Surgicount Correct: Yes       Other providers:       Anesthesia    Method:  Epidural          Details (if applicable):  Trial of Labor      Categorization:      Priority:     Indications for :     Incision Type:       Additional  information:  Forceps:    Vacuum:    Breech:    Observed anomalies    Other (Comments):

## 2019-12-11 LAB
RPR SER QL: NORMAL
RUBV IGG SER-ACNC: 27 IU/ML
RUBV IGG SER-IMP: REACTIVE

## 2019-12-11 PROCEDURE — 11000001 HC ACUTE MED/SURG PRIVATE ROOM

## 2019-12-11 PROCEDURE — 99024 PR POST-OP FOLLOW-UP VISIT: ICD-10-PCS | Mod: ,,, | Performed by: OBSTETRICS & GYNECOLOGY

## 2019-12-11 PROCEDURE — 25000003 PHARM REV CODE 250: Performed by: OBSTETRICS & GYNECOLOGY

## 2019-12-11 PROCEDURE — 99024 POSTOP FOLLOW-UP VISIT: CPT | Mod: ,,, | Performed by: OBSTETRICS & GYNECOLOGY

## 2019-12-11 RX ADMIN — IBUPROFEN 600 MG: 600 TABLET, FILM COATED ORAL at 07:12

## 2019-12-11 RX ADMIN — IBUPROFEN 600 MG: 600 TABLET, FILM COATED ORAL at 01:12

## 2019-12-11 RX ADMIN — PRENATAL VIT W/ FE FUMARATE-FA TAB 27-0.8 MG 1 TABLET: 27-0.8 TAB at 08:12

## 2019-12-11 NOTE — PROGRESS NOTES
[facility]  Obstetrics  Postpartum Progress Note    Patient Name: Rina Hurtado  MRN: [id number]  Admission Date: 12/10/2019  Hospital Length of Stay: 1 days  Attending Physician: Adal Augustin MD  Primary Care Provider: Primary Doctor No    Subjective:     Principal Problem:Vaginal delivery    Hospital course: Rina had uncomplicated   PPD1: Good postpartum condition, no major complaints    She is doing well this morning. She is tolerating a regular diet without nausea or vomiting. She is voiding spontaneously. She is ambulating. She has passed flatus, and has not a BM. Vaginal bleeding is mild. She denies fever or chills. Abdominal pain is mild and controlled with oral medications. She is breastfeeding. She desires circumcision for her male baby: no.    Objective:     Vital Signs (Most Recent):  Temp: 98.1 °F (36.7 °C) (19)  Pulse: 72 (19)  Resp: 18 (19)  BP: (!) 96/54 (19)  SpO2: 97 % (19) Vital Signs (24h Range):  Temp:  [97.9 °F (36.6 °C)-98.2 °F (36.8 °C)] 98.1 °F (36.7 °C)  Pulse:  [68-77] 72  Resp:  [18] 18  SpO2:  [96 %-98 %] 97 %  BP: ()/(52-56) 96/54        There is no height or weight on file to calculate BMI.      Intake/Output Summary (Last 24 hours) at 2019 1007  Last data filed at 12/10/2019 1100  Gross per 24 hour   Intake --   Output 400 ml   Net -400 ml       Significant Labs:  Lab Results   Component Value Date    GROUPTRH O POS 12/10/2019    HEPBSAG Negative 2019    STREPBCULT No Group B Streptococcus isolated 2019     Recent Labs   Lab 12/10/19  1634   HGB 11.0*   HCT 33.3*       Physical Exam:   Constitutional: She is oriented to person, place, and time. She appears well-developed and well-nourished. No distress.    HENT:   Head: Normocephalic and atraumatic.       Pulmonary/Chest: Effort normal. No respiratory distress.        Abdominal: Soft. She exhibits no distension. There is no  tenderness. There is no rebound and no guarding.   Fundus firm, NT, below umbilicus                   Neurological: She is alert and oriented to person, place, and time.    Skin: Skin is warm and dry. She is not diaphoretic.    Psychiatric: She has a normal mood and affect.       Assessment/Plan:     38 y.o. female  for:    * Vaginal delivery  Good postpartum condition, continue routine care      Disposition: As patient meets milestones, will plan to discharge tomorrow.    Nabila Hdez, DO  Obstetrics  [facility]

## 2019-12-11 NOTE — LACTATION NOTE
Reviewed basic breastfeeding education.discuss breastfeeding baby often because of baby with + kuldeep. Pt has no questions. Lc number on whiteboard. Encouraged pt to call for breastfeeding assessment/assistacne.

## 2019-12-11 NOTE — HPI
Rina Hurtado is a 38 y.o.  female with an Estimated Date of Delivery: 19 admitted for induction of labor.  Her current obstetrical history is complicated by AMA age 38, velamentous cord insert and transfer of care at 28 weeks from [facility].

## 2019-12-11 NOTE — ANESTHESIA POSTPROCEDURE EVALUATION
Anesthesia Post Evaluation    Patient: Rina Hurtado    Procedure(s) Performed: * No procedures listed *    Final Anesthesia Type: epidural    Patient location during evaluation: floor  Patient participation: Yes- Able to Participate  Level of consciousness: awake and alert, awake and oriented  Post-procedure vital signs: reviewed and stable  Pain management: adequate  Airway patency: patent    PONV status at discharge: No PONV  Anesthetic complications: no      Cardiovascular status: blood pressure returned to baseline  Respiratory status: unassisted, spontaneous ventilation and room air  Hydration status: euvolemic            Vitals Value Taken Time   BP 96/54 12/11/2019  8:38 AM   Temp 36.7 °C (98.1 °F) 12/11/2019  8:38 AM   Pulse 72 12/11/2019  8:38 AM   Resp 18 12/11/2019  8:38 AM   SpO2 97 % 12/11/2019  8:38 AM         No case tracking events are documented in the log.      Pain/Vern Score: Pain Rating Prior to Med Admin: 4 (12/11/2019  7:20 AM)  Pain Rating Post Med Admin: 0 (12/11/2019  8:15 AM)

## 2019-12-11 NOTE — SUBJECTIVE & OBJECTIVE
Hospital course: Rina had uncomplicated   PPD1: Good postpartum condition, no major complaints    She is doing well this morning. She is tolerating a regular diet without nausea or vomiting. She is voiding spontaneously. She is ambulating. She has passed flatus, and has not a BM. Vaginal bleeding is mild. She denies fever or chills. Abdominal pain is mild and controlled with oral medications. She is breastfeeding. She desires circumcision for her male baby: no.    Objective:     Vital Signs (Most Recent):  Temp: 98.1 °F (36.7 °C) (19)  Pulse: 72 (19)  Resp: 18 (19)  BP: (!) 96/54 (19)  SpO2: 97 % (19) Vital Signs (24h Range):  Temp:  [97.9 °F (36.6 °C)-98.2 °F (36.8 °C)] 98.1 °F (36.7 °C)  Pulse:  [68-77] 72  Resp:  [18] 18  SpO2:  [96 %-98 %] 97 %  BP: ()/(52-56) 96/54        There is no height or weight on file to calculate BMI.      Intake/Output Summary (Last 24 hours) at 2019 1007  Last data filed at 12/10/2019 1100  Gross per 24 hour   Intake --   Output 400 ml   Net -400 ml       Significant Labs:  Lab Results   Component Value Date    GROUPTRH O POS 12/10/2019    HEPBSAG Negative 2019    STREPBCULT No Group B Streptococcus isolated 2019     Recent Labs   Lab 12/10/19  1634   HGB 11.0*   HCT 33.3*       Physical Exam:   Constitutional: She is oriented to person, place, and time. She appears well-developed and well-nourished. No distress.    HENT:   Head: Normocephalic and atraumatic.       Pulmonary/Chest: Effort normal. No respiratory distress.        Abdominal: Soft. She exhibits no distension. There is no tenderness. There is no rebound and no guarding.   Fundus firm, NT, below umbilicus                   Neurological: She is alert and oriented to person, place, and time.    Skin: Skin is warm and dry. She is not diaphoretic.    Psychiatric: She has a normal mood and affect.

## 2019-12-11 NOTE — HOSPITAL COURSE
Rina had uncomplicated   PPD1: Good postpartum condition, no major complaints  PPD2: Patient is without complaint, breastfeeding well. Baby may be help by pediatrician.

## 2019-12-11 NOTE — PLAN OF CARE
Patient was not available.   provided a compassionate presence and reflective listening for family.

## 2019-12-12 VITALS
SYSTOLIC BLOOD PRESSURE: 93 MMHG | RESPIRATION RATE: 18 BRPM | OXYGEN SATURATION: 97 % | DIASTOLIC BLOOD PRESSURE: 51 MMHG | TEMPERATURE: 98 F | HEART RATE: 63 BPM

## 2019-12-12 PROBLEM — O43.123 VELAMENTOUS INSERTION OF UMBILICAL CORD IN THIRD TRIMESTER: Status: RESOLVED | Noted: 2019-09-17 | Resolved: 2019-12-12

## 2019-12-12 PROBLEM — O09.523 ELDERLY MULTIGRAVIDA IN THIRD TRIMESTER: Status: RESOLVED | Noted: 2019-09-17 | Resolved: 2019-12-12

## 2019-12-12 PROCEDURE — 25000003 PHARM REV CODE 250: Performed by: OBSTETRICS & GYNECOLOGY

## 2019-12-12 PROCEDURE — 99024 POSTOP FOLLOW-UP VISIT: CPT | Mod: ,,, | Performed by: OBSTETRICS & GYNECOLOGY

## 2019-12-12 PROCEDURE — 99024 PR POST-OP FOLLOW-UP VISIT: ICD-10-PCS | Mod: ,,, | Performed by: OBSTETRICS & GYNECOLOGY

## 2019-12-12 RX ADMIN — IBUPROFEN 600 MG: 600 TABLET, FILM COATED ORAL at 05:12

## 2019-12-12 RX ADMIN — PRENATAL VIT W/ FE FUMARATE-FA TAB 27-0.8 MG 1 TABLET: 27-0.8 TAB at 09:12

## 2019-12-12 RX ADMIN — IBUPROFEN 600 MG: 600 TABLET, FILM COATED ORAL at 11:12

## 2019-12-12 NOTE — LACTATION NOTE
12/12/19 6681   Maternal Infant Feeding   Maternal Emotional State relaxed   Latch Assistance no   Lactation Referrals   Lactation Referrals outpatient lactation program;support group   discharge breastfeeding education provided. Questions answered. Pt has lactation contact number. Pt independently latches baby to breast. Pt does not want latch to be assessed.

## 2019-12-12 NOTE — PLAN OF CARE
Pt ambulating, voiding and passing flatus. Pt tolerating PO well and no SS of distress at this time.  Pain well controlled well throughout shift by oral pain medication. Bleeding has been light throughout shift and fundus is firm. Mother baby care guide reviewed. All questions answered. Medications delivered to bedside. Reviewed medication list, when to call provider, and SS of infection. Pt stable at this time. ID band verified.  Patient verbalized understanding to make follow up appointment with OB in six weeks. Awaiting transport.

## 2019-12-12 NOTE — DISCHARGE SUMMARY
[facility]  Obstetrics  Discharge Summary      Patient Name: Rina Hurtado  MRN: [id number]  Admission Date: 12/10/2019  Hospital Length of Stay: 2 days  Discharge Date and Time:  2019 10:47 AM  Attending Physician: Adal Augustin MD   Discharging Provider: Dania Castillo MD   Primary Care Provider: Primary Doctor No    HPI: Rina Hurtado is a 38 y.o.  female with an Estimated Date of Delivery: 19 admitted for induction of labor.  Her current obstetrical history is complicated by AMA age 38, velamentous cord insert and transfer of care at 28 weeks from [facility].          * No surgery found *     Hospital Course:   Rina had uncomplicated   PPD1: Good postpartum condition, no major complaints  PPD2: Patient is without complaint, breastfeeding well. Baby may be help by pediatrician.         Final Active Diagnoses:    Diagnosis Date Noted POA    PRINCIPAL PROBLEM:  Vaginal delivery [O80] 12/10/2019 Not Applicable      Problems Resolved During this Admission:    Diagnosis Date Noted Date Resolved POA    Supervision of other normal pregnancy, antepartum [Z34.80] 12/10/2019 12/10/2019 Not Applicable    Multigravida of advanced maternal age in third trimester [O09.523] 12/10/2019 12/10/2019 Yes            Feeding Method: breast    Immunizations     Date Immunization Status Dose Route/Site Given by    12/10/19 0729 MMR Incomplete 0.5 mL Subcutaneous/Left deltoid     12/10/19 0729 Tdap Incomplete 0.5 mL Intramuscular/Left deltoid           Delivery:    Episiotomy: None   Lacerations: 1st   Repair suture:     Repair # of packets: 1   Blood loss (ml): 300     Birth information:  YOB: 2021   Time of birth: 4:48 AM   Sex: male   Delivery type: Vaginal, Spontaneous   Gestational Age: 40w4d    Delivery Clinician:      Other providers:       Additional  information:  Forceps:    Vacuum:    Breech:    Observed anomalies      Living?:           APGARS  One minute Five  minutes Ten minutes   Skin color:         Heart rate:         Grimace:         Muscle tone:         Breathing:         Totals: 9  9        Placenta: Delivered:       appearance    Pending Diagnostic Studies:     None          Discharged Condition: good    Disposition: Home or Self Care    Follow Up:  Follow-up Information     Adal Augustin MD In 6 weeks.    Specialties:  Obstetrics and Gynecology, Obstetrics, Gynecology  Why:  Post partum exam  Contact information:  [address]  [address]  [facility] [address]  [phone]                 Patient Instructions:      Call MD for:  temperature >100.4     Call MD for:  persistent nausea and vomiting or diarrhea     Call MD for:  severe uncontrolled pain     Call MD for:  redness, tenderness, or signs of infection (pain, swelling, redness, odor or green/yellow discharge around incision site)     No dressing needed     Medications:  Current Discharge Medication List      START taking these medications    Details   ibuprofen (ADVIL,MOTRIN) 600 MG tablet Take 1 tablet (600 mg total) by mouth every 6 (six) hours as needed (cramping).  Qty: 40 tablet, Refills: 0      oxyCODONE-acetaminophen (PERCOCET) 5-325 mg per tablet Take 1 tablet by mouth every 4 (four) hours as needed.  Qty: 20 tablet, Refills: 0    Comments: Quantity prescribed more than 7 day supply? No         CONTINUE these medications which have NOT CHANGED    Details   PRENATAL VITS 8-IRON-FOLIC-DHA ORAL Take by mouth.             Dania Castillo MD  Obstetrics  [facility]

## 2019-12-12 NOTE — PROGRESS NOTES
[facility]  Obstetrics  Postpartum Progress Note    Patient Name: Rina Hurtado  MRN: [id number]  Admission Date: 12/10/2019  Hospital Length of Stay: 2 days  Attending Physician: Adal Augustin MD  Primary Care Provider: Primary Doctor No    Subjective:     Principal Problem:Vaginal delivery    Hospital course: Rina had uncomplicated   PPD1: Good postpartum condition, no major complaints  PPD2: Patient is without complaint, breastfeeding well. Baby may be help by pediatrician.    Interval History:     She is doing well this morning. She is tolerating a regular diet without nausea or vomiting. She is voiding spontaneously. She is ambulating. She has passed flatus, and has not a BM. Vaginal bleeding is mild. She denies fever or chills. Abdominal pain is mild and controlled with oral medications. She is breastfeeding. She desires circumcision for her male baby: no.    Objective:     Vital Signs (Most Recent):  Temp: 98.2 °F (36.8 °C) (19 0750)  Pulse: 63 (19 0750)  Resp: 18 (19 0750)  BP: (!) 93/51 (19 0750)  SpO2: 97 % (19 0750) Vital Signs (24h Range):  Temp:  [97.9 °F (36.6 °C)-98.6 °F (37 °C)] 98.2 °F (36.8 °C)  Pulse:  [63-87] 63  Resp:  [16-18] 18  SpO2:  [95 %-97 %] 97 %  BP: ()/(51-59) 93/51        There is no height or weight on file to calculate BMI.    No intake or output data in the 24 hours ending 19 1044    Significant Labs:  Lab Results   Component Value Date    GROUPTRH O POS 12/10/2019    HEPBSAG Negative 2019    STREPBCULT No Group B Streptococcus isolated 2019     Recent Labs   Lab 12/10/19  1634   HGB 11.0*   HCT 33.3*       I have personallly reviewed all pertinent lab results from the last 24 hours.    Physical Exam:   Constitutional: She is oriented to person, place, and time. She appears well-developed and well-nourished.       Cardiovascular: Normal rate.     Pulmonary/Chest: Effort normal.        Abdominal: Soft.  Abdominal incision: Clean, dry and intact. There is no tenderness (Mild incisional tenderness, no fundal tenderness).     Genitourinary: Uterus normal.           Musculoskeletal: She exhibits edema (1+ edema). She exhibits no tenderness.       Neurological: She is alert and oriented to person, place, and time.     Psychiatric: She has a normal mood and affect.       Assessment/Plan:     38 y.o. female  for:    * Vaginal delivery  Good postpartum condition, continue routine care. Postpartum instructions reviewed.        Disposition: As patient meets milestones, will plan to discharge today.    Dania Castillo MD  Obstetrics  [facility]

## 2019-12-12 NOTE — SUBJECTIVE & OBJECTIVE
Hospital course: Rina had uncomplicated   PPD1: Good postpartum condition, no major complaints  PPD2: Patient is without complaint, breastfeeding well. Baby may be help by pediatrician.    Interval History:     She is doing well this morning. She is tolerating a regular diet without nausea or vomiting. She is voiding spontaneously. She is ambulating. She has passed flatus, and has not a BM. Vaginal bleeding is mild. She denies fever or chills. Abdominal pain is mild and controlled with oral medications. She is breastfeeding. She desires circumcision for her male baby: no.    Objective:     Vital Signs (Most Recent):  Temp: 98.2 °F (36.8 °C) (19 0750)  Pulse: 63 (19)  Resp: 18 (19)  BP: (!) 93/51 (19)  SpO2: 97 % (19) Vital Signs (24h Range):  Temp:  [97.9 °F (36.6 °C)-98.6 °F (37 °C)] 98.2 °F (36.8 °C)  Pulse:  [63-87] 63  Resp:  [16-18] 18  SpO2:  [95 %-97 %] 97 %  BP: ()/(51-59) 93/51        There is no height or weight on file to calculate BMI.    No intake or output data in the 24 hours ending 19 1044    Significant Labs:  Lab Results   Component Value Date    GROUPTRH O POS 12/10/2019    HEPBSAG Negative 2019    STREPBCULT No Group B Streptococcus isolated 2019     Recent Labs   Lab 12/10/19  1634   HGB 11.0*   HCT 33.3*       I have personallly reviewed all pertinent lab results from the last 24 hours.    Physical Exam:   Constitutional: She is oriented to person, place, and time. She appears well-developed and well-nourished.       Cardiovascular: Normal rate.     Pulmonary/Chest: Effort normal.        Abdominal: Soft. Abdominal incision: Clean, dry and intact. There is no tenderness (Mild incisional tenderness, no fundal tenderness).     Genitourinary: Uterus normal.           Musculoskeletal: She exhibits edema (1+ edema). She exhibits no tenderness.       Neurological: She is alert and oriented to person, place, and time.      Psychiatric: She has a normal mood and affect.

## 2020-02-06 ENCOUNTER — POSTPARTUM VISIT (OUTPATIENT)
Dept: OBSTETRICS AND GYNECOLOGY | Facility: CLINIC | Age: 40
End: 2020-02-06
Attending: OBSTETRICS & GYNECOLOGY
Payer: COMMERCIAL

## 2020-02-06 VITALS
WEIGHT: 145.19 LBS | DIASTOLIC BLOOD PRESSURE: 60 MMHG | BODY MASS INDEX: 26.72 KG/M2 | SYSTOLIC BLOOD PRESSURE: 100 MMHG | HEIGHT: 62 IN

## 2020-02-06 DIAGNOSIS — Z01.419 ENCOUNTER FOR GYNECOLOGICAL EXAMINATION: Primary | ICD-10-CM

## 2020-02-06 DIAGNOSIS — Z12.31 OTHER SCREENING MAMMOGRAM: ICD-10-CM

## 2020-02-06 PROCEDURE — 0503F POSTPARTUM CARE VISIT: CPT | Mod: S$GLB,,, | Performed by: OBSTETRICS & GYNECOLOGY

## 2020-02-06 PROCEDURE — 99999 PR PBB SHADOW E&M-EST. PATIENT-LVL III: CPT | Mod: PBBFAC,,, | Performed by: OBSTETRICS & GYNECOLOGY

## 2020-02-06 PROCEDURE — 99999 PR PBB SHADOW E&M-EST. PATIENT-LVL III: ICD-10-PCS | Mod: PBBFAC,,, | Performed by: OBSTETRICS & GYNECOLOGY

## 2020-02-06 PROCEDURE — 0503F PR POSTPARTUM CARE VISIT: ICD-10-PCS | Mod: S$GLB,,, | Performed by: OBSTETRICS & GYNECOLOGY

## 2020-02-06 NOTE — PROGRESS NOTES
"CC: PP CHECK     Rina Hurtado is a 39 y.o. female  is 8 wks s/p uncomplicated , doing much better,  is better, no depression no pain no bleeding declines contraception.    Past Medical History:   Diagnosis Date    AMA (advanced maternal age) multigravida 35+        History reviewed. No pertinent surgical history.    OB History    Para Term  AB Living   3 2 2   1 2   SAB TAB Ectopic Multiple Live Births   1     0 2      # Outcome Date GA Lbr Alexis/2nd Weight Sex Delivery Anes PTL Lv   3 Term 12/10/19 40w4d / 00:18 3.742 kg (8 lb 4 oz) M Vag-Spont EPI N LUCIA   2 Term 12/08/15 41w0d  3.374 kg (7 lb 7 oz) F Vag-Spont None N LUCIA      Birth Comments: NCB, labor with pitocin augmentation   1 SAB  9w0d             Birth Comments: SAB -- No D&C       Family History   Problem Relation Age of Onset    No Known Problems Father     Diabetes Mother     No Known Problems Brother     Breast cancer Neg Hx     Colon cancer Neg Hx     Stroke Neg Hx     Ovarian cancer Neg Hx        Social History     Tobacco Use    Smoking status: Former Smoker    Smokeless tobacco: Never Used   Substance Use Topics    Alcohol use: Not Currently    Drug use: Never       /60   Ht 5' 2" (1.575 m)   Wt 65.9 kg (145 lb 2.8 oz)   LMP  (LMP Unknown)   Breastfeeding? Yes   BMI 26.55 kg/m²     ROS:  GENERAL: Denies weight gain or weight loss. Feeling well overall.   SKIN: Denies rash or lesions.   HEAD: Denies head injury or headache.   NODES: Denies enlarged lymph nodes.   CHEST: Denies chest pain or shortness of breath.   CARDIOVASCULAR: Denies palpitations or left sided chest pain.   ABDOMEN: No abdominal pain, constipation, diarrhea, nausea, vomiting or rectal bleeding.   URINARY: No frequency, dysuria, hematuria, or burning on urination.  REPRODUCTIVE: See HPI.   BREASTS: denies pain, lumps, or nipple discharge.   HEMATOLOGIC: No easy bruisability or excessive bleeding.  MUSCULOSKELETAL: Denies joint " pain or swelling.   NEUROLOGIC: Denies syncope or weakness.   PSYCHIATRIC: Denies depression, anxiety or mood swings.    Physical Exam:    APPEARANCE: Well nourished, well developed, in no acute distress.  AFFECT: WNL, alert and oriented x 3  SKIN: No acne or hirsutism  NECK: Neck symmetric without masses or thyromegaly  NODES: No inguinal, cervical, axillary, or femoral lymph node enlargement  CHEST: Good respiratory effect  ABDOMEN: Soft.  No tenderness or masses.  No hepatosplenomegaly.  No hernias.  PELVIC: Normal external genitalia without lesions.  Normal hair distribution.  Adequate perineal body, normal urethral meatus.  Vagina moist and well rugated without lesions or discharge.  Cervix pink, without lesions, discharge or tenderness.  No significant cystocele or rectocele.  Bimanual exam shows uterus to be normal size, regular, mobile and nontender.  Adnexa without masses or tenderness.    EXTREMITIES: No edema.    ASSESSMENT AND PLAN    Rina was seen today for postpartum follow-up.    Diagnoses and all orders for this visit:    Encounter for gynecological examination    Other screening mammogram  -     Mammo Digital Screening Bilat w/ Abdoulaye; Future        Follow up in about 3 months (around 5/6/2020) for wwe.

## 2020-02-18 ENCOUNTER — TELEPHONE (OUTPATIENT)
Dept: OBSTETRICS AND GYNECOLOGY | Facility: CLINIC | Age: 40
End: 2020-02-18

## 2020-02-18 NOTE — TELEPHONE ENCOUNTER
Dr. Augustin pt called asking to speak to Lucinda about her labor and delivery charges. I informed pt that Lucinda will return back to clinic on Thursday. Pt understood. Please advise. Thank you.

## 2020-02-27 ENCOUNTER — TELEPHONE (OUTPATIENT)
Dept: OBSTETRICS AND GYNECOLOGY | Facility: CLINIC | Age: 40
End: 2020-02-27

## 2020-03-10 ENCOUNTER — TELEPHONE (OUTPATIENT)
Dept: OBSTETRICS AND GYNECOLOGY | Facility: CLINIC | Age: 40
End: 2020-03-10

## 2020-03-10 NOTE — TELEPHONE ENCOUNTER
L/M to let her know, her letter to return to work was approved and ready for her to view, through her portal, if she would like for me to fax to her work, please let know a fax number to fax for her.

## 2020-03-10 NOTE — LETTER
March 10, 2020    Rina Hurtado  [address] [facility] [place] [address]             [facility]  [address], [address]  [place] [address]  Phone: [phone]  Fax: [phone] To Whom It May Concern:    My Patient Rina Hurtado, is allowed to return to work on Tuesday March 24, 2020  without any restrictions.    If you have any questions, please call my office [phone]    Sincerely,            Adal Augustin MD

## 2020-03-10 NOTE — TELEPHONE ENCOUNTER
Pt. Requesting a return to work letter, pt. Delivered 12/10/2019 and wants to return on ;  Tuesday 03/24/2020, will dr. Augustin approve this letter, pt. Did come for her 6 wks....

## 2020-03-12 ENCOUNTER — TELEPHONE (OUTPATIENT)
Dept: OBSTETRICS AND GYNECOLOGY | Facility: CLINIC | Age: 40
End: 2020-03-12

## 2020-03-12 NOTE — TELEPHONE ENCOUNTER
Dr Augustin pt is calling, needs her return to work note to have a different date to return to work is 3-3-20. Please upload the letter to her portal. Pt # [phone]

## 2020-03-12 NOTE — LETTER
March 12, 2020    Rina Hurtado  [address] [facility] [place] [address]             [facility]  [address], [address]  [place] [address]  Phone: [phone]  Fax: [phone] To Whom It May Cocern:    My Patient Rina Hurtado, is allowed to return to work on Tuesday March 3, 2020 without any restrictions.     If you have any questions, please call my office:  [phone]    Sincerely,               Adal Augustni MD

## 2021-07-17 ENCOUNTER — OFFICE VISIT (OUTPATIENT)
Dept: URGENT CARE | Facility: CLINIC | Age: 41
End: 2021-07-17
Payer: COMMERCIAL

## 2021-07-17 VITALS
HEIGHT: 62 IN | BODY MASS INDEX: 26.68 KG/M2 | HEART RATE: 75 BPM | OXYGEN SATURATION: 97 % | WEIGHT: 145 LBS | RESPIRATION RATE: 17 BRPM | TEMPERATURE: 98 F | SYSTOLIC BLOOD PRESSURE: 105 MMHG | DIASTOLIC BLOOD PRESSURE: 72 MMHG

## 2021-07-17 DIAGNOSIS — Z11.59 SCREENING FOR VIRAL DISEASE: Primary | ICD-10-CM

## 2021-07-17 DIAGNOSIS — J02.9 SORE THROAT: ICD-10-CM

## 2021-07-17 LAB
CTP QC/QA: YES
SARS-COV-2 RDRP RESP QL NAA+PROBE: NEGATIVE

## 2021-07-17 PROCEDURE — 99202 OFFICE O/P NEW SF 15 MIN: CPT | Mod: S$GLB,,, | Performed by: FAMILY MEDICINE

## 2021-07-17 PROCEDURE — 3008F PR BODY MASS INDEX (BMI) DOCUMENTED: ICD-10-PCS | Mod: CPTII,S$GLB,, | Performed by: FAMILY MEDICINE

## 2021-07-17 PROCEDURE — 3008F BODY MASS INDEX DOCD: CPT | Mod: CPTII,S$GLB,, | Performed by: FAMILY MEDICINE

## 2021-07-17 PROCEDURE — U0002: ICD-10-PCS | Mod: QW,S$GLB,, | Performed by: FAMILY MEDICINE

## 2021-07-17 PROCEDURE — 99202 PR OFFICE/OUTPT VISIT, NEW, LEVL II, 15-29 MIN: ICD-10-PCS | Mod: S$GLB,,, | Performed by: FAMILY MEDICINE

## 2021-07-17 PROCEDURE — U0002 COVID-19 LAB TEST NON-CDC: HCPCS | Mod: QW,S$GLB,, | Performed by: FAMILY MEDICINE

## 2021-08-01 ENCOUNTER — OFFICE VISIT (OUTPATIENT)
Dept: URGENT CARE | Facility: CLINIC | Age: 41
End: 2021-08-01
Payer: COMMERCIAL

## 2021-08-01 VITALS
BODY MASS INDEX: 22.08 KG/M2 | TEMPERATURE: 99 F | WEIGHT: 120 LBS | DIASTOLIC BLOOD PRESSURE: 53 MMHG | HEIGHT: 62 IN | OXYGEN SATURATION: 99 % | RESPIRATION RATE: 16 BRPM | SYSTOLIC BLOOD PRESSURE: 103 MMHG | HEART RATE: 93 BPM

## 2021-08-01 DIAGNOSIS — B96.89 ACUTE BACTERIAL BRONCHITIS: ICD-10-CM

## 2021-08-01 DIAGNOSIS — J20.8 ACUTE BACTERIAL BRONCHITIS: ICD-10-CM

## 2021-08-01 DIAGNOSIS — Z11.9 SCREENING EXAMINATION FOR UNSPECIFIED INFECTIOUS DISEASE: Primary | ICD-10-CM

## 2021-08-01 DIAGNOSIS — R05.9 COUGH: ICD-10-CM

## 2021-08-01 LAB
CTP QC/QA: YES
SARS-COV-2 RDRP RESP QL NAA+PROBE: NEGATIVE

## 2021-08-01 PROCEDURE — U0003 INFECTIOUS AGENT DETECTION BY NUCLEIC ACID (DNA OR RNA); SEVERE ACUTE RESPIRATORY SYNDROME CORONAVIRUS 2 (SARS-COV-2) (CORONAVIRUS DISEASE [COVID-19]), AMPLIFIED PROBE TECHNIQUE, MAKING USE OF HIGH THROUGHPUT TECHNOLOGIES AS DESCRIBED BY CMS-2020-01-R: HCPCS | Performed by: NURSE PRACTITIONER

## 2021-08-01 PROCEDURE — U0002 COVID-19 LAB TEST NON-CDC: HCPCS | Mod: QW,S$GLB,, | Performed by: NURSE PRACTITIONER

## 2021-08-01 PROCEDURE — 1160F PR REVIEW ALL MEDS BY PRESCRIBER/CLIN PHARMACIST DOCUMENTED: ICD-10-PCS | Mod: CPTII,S$GLB,, | Performed by: NURSE PRACTITIONER

## 2021-08-01 PROCEDURE — 71046 X-RAY EXAM CHEST 2 VIEWS: CPT | Mod: FY,S$GLB,, | Performed by: RADIOLOGY

## 2021-08-01 PROCEDURE — 1125F PR PAIN SEVERITY QUANTIFIED, PAIN PRESENT: ICD-10-PCS | Mod: CPTII,S$GLB,, | Performed by: NURSE PRACTITIONER

## 2021-08-01 PROCEDURE — 1159F MED LIST DOCD IN RCRD: CPT | Mod: CPTII,S$GLB,, | Performed by: NURSE PRACTITIONER

## 2021-08-01 PROCEDURE — 3008F BODY MASS INDEX DOCD: CPT | Mod: CPTII,S$GLB,, | Performed by: NURSE PRACTITIONER

## 2021-08-01 PROCEDURE — 1160F RVW MEDS BY RX/DR IN RCRD: CPT | Mod: CPTII,S$GLB,, | Performed by: NURSE PRACTITIONER

## 2021-08-01 PROCEDURE — 3078F PR MOST RECENT DIASTOLIC BLOOD PRESSURE < 80 MM HG: ICD-10-PCS | Mod: CPTII,S$GLB,, | Performed by: NURSE PRACTITIONER

## 2021-08-01 PROCEDURE — 3074F PR MOST RECENT SYSTOLIC BLOOD PRESSURE < 130 MM HG: ICD-10-PCS | Mod: CPTII,S$GLB,, | Performed by: NURSE PRACTITIONER

## 2021-08-01 PROCEDURE — 99213 OFFICE O/P EST LOW 20 MIN: CPT | Mod: S$GLB,CS,, | Performed by: NURSE PRACTITIONER

## 2021-08-01 PROCEDURE — 3074F SYST BP LT 130 MM HG: CPT | Mod: CPTII,S$GLB,, | Performed by: NURSE PRACTITIONER

## 2021-08-01 PROCEDURE — 71046 XR CHEST PA AND LATERAL: ICD-10-PCS | Mod: FY,S$GLB,, | Performed by: RADIOLOGY

## 2021-08-01 PROCEDURE — 1125F AMNT PAIN NOTED PAIN PRSNT: CPT | Mod: CPTII,S$GLB,, | Performed by: NURSE PRACTITIONER

## 2021-08-01 PROCEDURE — 3078F DIAST BP <80 MM HG: CPT | Mod: CPTII,S$GLB,, | Performed by: NURSE PRACTITIONER

## 2021-08-01 PROCEDURE — 3008F PR BODY MASS INDEX (BMI) DOCUMENTED: ICD-10-PCS | Mod: CPTII,S$GLB,, | Performed by: NURSE PRACTITIONER

## 2021-08-01 PROCEDURE — 1159F PR MEDICATION LIST DOCUMENTED IN MEDICAL RECORD: ICD-10-PCS | Mod: CPTII,S$GLB,, | Performed by: NURSE PRACTITIONER

## 2021-08-01 PROCEDURE — 99213 PR OFFICE/OUTPT VISIT, EST, LEVL III, 20-29 MIN: ICD-10-PCS | Mod: S$GLB,CS,, | Performed by: NURSE PRACTITIONER

## 2021-08-01 PROCEDURE — U0002: ICD-10-PCS | Mod: QW,S$GLB,, | Performed by: NURSE PRACTITIONER

## 2021-08-01 PROCEDURE — U0005 INFEC AGEN DETEC AMPLI PROBE: HCPCS | Performed by: NURSE PRACTITIONER

## 2021-08-01 RX ORDER — BENZONATATE 100 MG/1
200 CAPSULE ORAL 3 TIMES DAILY PRN
Qty: 20 CAPSULE | Refills: 0 | Status: SHIPPED | OUTPATIENT
Start: 2021-08-01

## 2021-08-01 RX ORDER — AMOXICILLIN AND CLAVULANATE POTASSIUM 875; 125 MG/1; MG/1
1 TABLET, FILM COATED ORAL 2 TIMES DAILY
Qty: 14 TABLET | Refills: 0 | Status: SHIPPED | OUTPATIENT
Start: 2021-08-01 | End: 2021-08-08

## 2021-08-02 ENCOUNTER — TELEPHONE (OUTPATIENT)
Dept: URGENT CARE | Facility: CLINIC | Age: 41
End: 2021-08-02

## 2021-08-02 LAB
SARS-COV-2 RNA RESP QL NAA+PROBE: NOT DETECTED
SARS-COV-2- CYCLE NUMBER: -1

## 2021-08-04 ENCOUNTER — TELEPHONE (OUTPATIENT)
Dept: URGENT CARE | Facility: CLINIC | Age: 41
End: 2021-08-04

## 2023-10-18 ENCOUNTER — OFFICE VISIT (OUTPATIENT)
Dept: URGENT CARE | Facility: CLINIC | Age: 43
End: 2023-10-18
Payer: COMMERCIAL

## 2023-10-18 VITALS
RESPIRATION RATE: 16 BRPM | DIASTOLIC BLOOD PRESSURE: 53 MMHG | OXYGEN SATURATION: 99 % | WEIGHT: 120 LBS | SYSTOLIC BLOOD PRESSURE: 95 MMHG | TEMPERATURE: 98 F | HEIGHT: 62 IN | BODY MASS INDEX: 22.08 KG/M2 | HEART RATE: 61 BPM

## 2023-10-18 DIAGNOSIS — S93.402A SPRAIN OF LEFT ANKLE, UNSPECIFIED LIGAMENT, INITIAL ENCOUNTER: Primary | ICD-10-CM

## 2023-10-18 PROCEDURE — 73610 XR ANKLE COMPLETE 3 VIEW LEFT: ICD-10-PCS | Mod: LT,S$GLB,, | Performed by: RADIOLOGY

## 2023-10-18 PROCEDURE — 99214 OFFICE O/P EST MOD 30 MIN: CPT | Mod: S$GLB,,, | Performed by: SURGERY

## 2023-10-18 PROCEDURE — 99214 PR OFFICE/OUTPT VISIT, EST, LEVL IV, 30-39 MIN: ICD-10-PCS | Mod: S$GLB,,, | Performed by: SURGERY

## 2023-10-18 PROCEDURE — 73610 X-RAY EXAM OF ANKLE: CPT | Mod: LT,S$GLB,, | Performed by: RADIOLOGY

## 2023-10-18 RX ORDER — SERTRALINE HYDROCHLORIDE 50 MG/1
50 TABLET, FILM COATED ORAL
COMMUNITY
Start: 2023-08-20 | End: 2023-10-18 | Stop reason: ALTCHOICE

## 2023-10-18 RX ORDER — SERTRALINE HYDROCHLORIDE 25 MG/1
25 TABLET, FILM COATED ORAL
COMMUNITY
Start: 2023-10-07

## 2023-10-18 NOTE — PROGRESS NOTES
"Subjective:      Patient ID: Rina Hurtado is a 42 y.o. female.    Vitals:  height is 5' 2" (1.575 m) and weight is 54.4 kg (120 lb). Her oral temperature is 98 °F (36.7 °C). Her blood pressure is 95/53 (abnormal) and her pulse is 61. Her respiration is 16 and oxygen saturation is 99%.     Chief Complaint: Injury (sprained ankle - Entered by patient)    Pt stated that she thinks she sprung her left ankle. This incident happen two weeks ago. She been using a ankle wrap and doing ice therapy. Pt b/p is low. However, she stated that she suffer with low b/p. She doesn't feel like she's about to faint.     Injury  This is a new problem. Episode onset: x 2 weeks. The problem has been unchanged. Associated symptoms include joint swelling. She has tried ice and NSAIDs for the symptoms. The treatment provided mild relief.       Musculoskeletal:  Positive for joint swelling.      Objective:     Physical Exam   Constitutional: She is oriented to person, place, and time. She appears well-developed. She is cooperative.  Non-toxic appearance. She does not appear ill. No distress.   HENT:   Head: Normocephalic and atraumatic. Head is without abrasion, without contusion and without laceration.   Ears:   Right Ear: Hearing and external ear normal. No hemotympanum.   Left Ear: Hearing and external ear normal. No hemotympanum.   Nose: Nose normal. No mucosal edema, rhinorrhea or nasal deformity. No epistaxis. Right sinus exhibits no maxillary sinus tenderness and no frontal sinus tenderness. Left sinus exhibits no maxillary sinus tenderness and no frontal sinus tenderness.   Mouth/Throat: Uvula is midline, oropharynx is clear and moist and mucous membranes are normal. No trismus in the jaw. Normal dentition. No uvula swelling. No posterior oropharyngeal erythema.   Eyes: Conjunctivae, EOM and lids are normal. Pupils are equal, round, and reactive to light. Right eye exhibits no discharge. Left eye exhibits no discharge. No scleral " icterus.   Neck: Trachea normal and phonation normal. Neck supple. No tracheal deviation present. No neck rigidity present. No spinous process tenderness present. No muscular tenderness present.   Cardiovascular: Normal rate, regular rhythm, normal heart sounds and normal pulses.   Pulmonary/Chest: Effort normal. No respiratory distress.   Abdominal: Normal appearance.   Musculoskeletal: Normal range of motion.         General: No deformity. Normal range of motion.      Left ankle: Tenderness. Lateral malleolus tenderness found.   Neurological: She is alert and oriented to person, place, and time. She has normal strength. No cranial nerve deficit or sensory deficit. She exhibits normal muscle tone. She displays no seizure activity. Coordination normal. GCS eye subscore is 4. GCS verbal subscore is 5. GCS motor subscore is 6.   Skin: Skin is warm, dry, intact, not diaphoretic and not pale. Capillary refill takes less than 2 seconds. No abrasion, No burn, No bruising and No ecchymosis   Psychiatric: Her speech is normal and behavior is normal. Judgment and thought content normal.   Nursing note and vitals reviewed.      Assessment:     1. Sprain of left ankle, unspecified ligament, initial encounter        Plan:       Sprain of left ankle, unspecified ligament, initial encounter  -     X-Ray Ankle Complete Left; Future; Expected date: 10/18/2023        Radiology Procedure Done: Left Ankle.  Interpretation: No fracture, no dislocation        Follow up with ortho given 2 weeks with little improvement/patient's concerns. ACE compression/support, elevate, ice as needed.

## 2023-11-26 ENCOUNTER — OFFICE VISIT (OUTPATIENT)
Dept: URGENT CARE | Facility: CLINIC | Age: 43
End: 2023-11-26
Payer: COMMERCIAL

## 2023-11-26 VITALS
OXYGEN SATURATION: 98 % | WEIGHT: 130 LBS | DIASTOLIC BLOOD PRESSURE: 50 MMHG | SYSTOLIC BLOOD PRESSURE: 101 MMHG | BODY MASS INDEX: 23.92 KG/M2 | TEMPERATURE: 98 F | HEART RATE: 71 BPM | HEIGHT: 62 IN | RESPIRATION RATE: 19 BRPM

## 2023-11-26 DIAGNOSIS — H93.11 TINNITUS OF RIGHT EAR: ICD-10-CM

## 2023-11-26 DIAGNOSIS — H92.01 RIGHT EAR PAIN: ICD-10-CM

## 2023-11-26 DIAGNOSIS — J30.89 SEASONAL ALLERGIC RHINITIS DUE TO OTHER ALLERGIC TRIGGER: ICD-10-CM

## 2023-11-26 DIAGNOSIS — H66.001 NON-RECURRENT ACUTE SUPPURATIVE OTITIS MEDIA OF RIGHT EAR WITHOUT SPONTANEOUS RUPTURE OF TYMPANIC MEMBRANE: Primary | ICD-10-CM

## 2023-11-26 PROCEDURE — 99213 OFFICE O/P EST LOW 20 MIN: CPT | Mod: 25,S$GLB,, | Performed by: PHYSICIAN ASSISTANT

## 2023-11-26 PROCEDURE — 96372 PR INJECTION,THERAP/PROPH/DIAG2ST, IM OR SUBCUT: ICD-10-PCS | Mod: S$GLB,,, | Performed by: PHYSICIAN ASSISTANT

## 2023-11-26 PROCEDURE — 96372 THER/PROPH/DIAG INJ SC/IM: CPT | Mod: S$GLB,,, | Performed by: PHYSICIAN ASSISTANT

## 2023-11-26 PROCEDURE — 99213 PR OFFICE/OUTPT VISIT, EST, LEVL III, 20-29 MIN: ICD-10-PCS | Mod: 25,S$GLB,, | Performed by: PHYSICIAN ASSISTANT

## 2023-11-26 RX ORDER — KETOROLAC TROMETHAMINE 30 MG/ML
30 INJECTION, SOLUTION INTRAMUSCULAR; INTRAVENOUS
Status: COMPLETED | OUTPATIENT
Start: 2023-11-26 | End: 2023-11-26

## 2023-11-26 RX ORDER — FLUTICASONE PROPIONATE 50 MCG
2 SPRAY, SUSPENSION (ML) NASAL DAILY PRN
Qty: 15.8 ML | Refills: 0 | Status: SHIPPED | OUTPATIENT
Start: 2023-11-26 | End: 2023-12-26

## 2023-11-26 RX ORDER — AMOXICILLIN AND CLAVULANATE POTASSIUM 875; 125 MG/1; MG/1
1 TABLET, FILM COATED ORAL EVERY 12 HOURS
Qty: 20 TABLET | Refills: 0 | Status: SHIPPED | OUTPATIENT
Start: 2023-11-26 | End: 2023-12-06

## 2023-11-26 RX ORDER — IBUPROFEN 600 MG/1
600 TABLET ORAL EVERY 6 HOURS PRN
Qty: 28 TABLET | Refills: 0 | Status: SHIPPED | OUTPATIENT
Start: 2023-11-27 | End: 2023-12-04

## 2023-11-26 RX ADMIN — KETOROLAC TROMETHAMINE 30 MG: 30 INJECTION, SOLUTION INTRAMUSCULAR; INTRAVENOUS at 03:11

## 2023-11-26 NOTE — PATIENT INSTRUCTIONS
Take tylenol today. Start ibuprofen tomorrow.      Recommend oral antihistamine (Claritin/zyrtec) +/- oral decongestant (pseudoephedrine) for rhinorrhea, steroid nasal spray (flonase) +/- ,Tylenol (Acetaminophen) and/or Motrin (Ibuprofen) as directed for control of pain and/or fever.     Claritin-D = loratadine + pseudoephedrine  Zyrtec -D= zyrtec + pseudoephedrine    For nose bleeds; recommend nasal irrigation with a saline spray/gel (AYR nasal gel) or Netti Pot like device per their directions is also recommended.  Please drink plenty of fluids.  Please get plenty of rest.  If you  smoke, please stop smoking.      Discussed prescriptions and over-the-counter medicines to help with patient's symptoms:  A steroid nose spray (flonase) can help with a stuffy nose. It can also help with drainage down the back of your throat.  An antihistamine (loratadine,zyrtec,allegra, xyzal) can help with itching, sneezing, or runny nose.  An antihistamine eye drop can help with itchy eyes.  A decongestant (pseudoephedrine,  Phenylephrine) can help with a stuffy nose. Take <10 days for congestion and rhinorrhea. Once symptoms improve, proceed with loratadine/zyrtec once a day. These ingredients can keep you up all night, decrease appetite, feel jittery, and raise blood pressure with long term use.        Please remember that you have received care at an urgent care today. Urgent cares are not emergency rooms and are not equipped to handle life threatening emergencies and cannot rule in or out certain medical conditions and you may be released before all of your medical problems are known or treated. Please arrange follow up with your primary care physician or speciality clinic  within 2-5 days if your signs and symptoms have not resolved or worsen. Patient can call our Referral Hotline at [phone] to make an appointment.    Please return here or go to the Emergency Department for any concerns or worsening of condition.Patient  was educated on signs/symptoms that would warrant emergent medical attention. Patient verbalized understanding.

## 2024-08-02 ENCOUNTER — TELEPHONE (OUTPATIENT)
Dept: SURGERY | Facility: CLINIC | Age: 44
End: 2024-08-02
Payer: COMMERCIAL

## 2024-08-02 NOTE — TELEPHONE ENCOUNTER
Called patient to discuss self schedule appt. Was in car with her children, will call me back. Direct contact # provided.

## 2025-01-15 ENCOUNTER — LAB VISIT (OUTPATIENT)
Dept: LAB | Facility: HOSPITAL | Age: 45
End: 2025-01-15
Payer: COMMERCIAL

## 2025-01-15 ENCOUNTER — OFFICE VISIT (OUTPATIENT)
Dept: INTERNAL MEDICINE | Facility: CLINIC | Age: 45
End: 2025-01-15
Payer: COMMERCIAL

## 2025-01-15 VITALS
SYSTOLIC BLOOD PRESSURE: 105 MMHG | BODY MASS INDEX: 26.09 KG/M2 | HEART RATE: 68 BPM | WEIGHT: 141.75 LBS | OXYGEN SATURATION: 96 % | DIASTOLIC BLOOD PRESSURE: 72 MMHG | HEIGHT: 62 IN

## 2025-01-15 DIAGNOSIS — Z17.0 MALIGNANT NEOPLASM OF LOWER-INNER QUADRANT OF LEFT BREAST IN FEMALE, ESTROGEN RECEPTOR POSITIVE: ICD-10-CM

## 2025-01-15 DIAGNOSIS — N39.3 STRESS INCONTINENCE OF URINE: ICD-10-CM

## 2025-01-15 DIAGNOSIS — C50.312 MALIGNANT NEOPLASM OF LOWER-INNER QUADRANT OF LEFT BREAST IN FEMALE, ESTROGEN RECEPTOR POSITIVE: ICD-10-CM

## 2025-01-15 DIAGNOSIS — Z00.00 ENCOUNTER FOR ANNUAL PHYSICAL EXAM: ICD-10-CM

## 2025-01-15 DIAGNOSIS — Z00.00 ENCOUNTER FOR ANNUAL PHYSICAL EXAM: Primary | ICD-10-CM

## 2025-01-15 LAB
ALBUMIN SERPL BCP-MCNC: 4.1 G/DL (ref 3.5–5.2)
ALP SERPL-CCNC: 71 U/L (ref 40–150)
ALT SERPL W/O P-5'-P-CCNC: 10 U/L (ref 10–44)
ANION GAP SERPL CALC-SCNC: 5 MMOL/L (ref 8–16)
AST SERPL-CCNC: 18 U/L (ref 10–40)
BASOPHILS # BLD AUTO: 0.03 K/UL (ref 0–0.2)
BASOPHILS NFR BLD: 0.6 % (ref 0–1.9)
BILIRUB SERPL-MCNC: 0.4 MG/DL (ref 0.1–1)
BUN SERPL-MCNC: 10 MG/DL (ref 6–20)
CALCIUM SERPL-MCNC: 9.3 MG/DL (ref 8.7–10.5)
CHLORIDE SERPL-SCNC: 107 MMOL/L (ref 95–110)
CHOLEST SERPL-MCNC: 132 MG/DL (ref 120–199)
CHOLEST/HDLC SERPL: 2.6 {RATIO} (ref 2–5)
CO2 SERPL-SCNC: 24 MMOL/L (ref 23–29)
CREAT SERPL-MCNC: 0.6 MG/DL (ref 0.5–1.4)
DIFFERENTIAL METHOD BLD: ABNORMAL
EOSINOPHIL # BLD AUTO: 0.4 K/UL (ref 0–0.5)
EOSINOPHIL NFR BLD: 7.6 % (ref 0–8)
ERYTHROCYTE [DISTWIDTH] IN BLOOD BY AUTOMATED COUNT: 12.7 % (ref 11.5–14.5)
EST. GFR  (NO RACE VARIABLE): >60 ML/MIN/1.73 M^2
ESTIMATED AVG GLUCOSE: 94 MG/DL (ref 68–131)
GLUCOSE SERPL-MCNC: 71 MG/DL (ref 70–110)
HBA1C MFR BLD: 4.9 % (ref 4–5.6)
HCT VFR BLD AUTO: 37.5 % (ref 37–48.5)
HCV AB SERPL QL IA: NORMAL
HDLC SERPL-MCNC: 51 MG/DL (ref 40–75)
HDLC SERPL: 38.6 % (ref 20–50)
HGB BLD-MCNC: 12.1 G/DL (ref 12–16)
IMM GRANULOCYTES # BLD AUTO: 0.01 K/UL (ref 0–0.04)
IMM GRANULOCYTES NFR BLD AUTO: 0.2 % (ref 0–0.5)
LDLC SERPL CALC-MCNC: 71.8 MG/DL (ref 63–159)
LYMPHOCYTES # BLD AUTO: 0.9 K/UL (ref 1–4.8)
LYMPHOCYTES NFR BLD: 19.9 % (ref 18–48)
MCH RBC QN AUTO: 30.4 PG (ref 27–31)
MCHC RBC AUTO-ENTMCNC: 32.3 G/DL (ref 32–36)
MCV RBC AUTO: 94 FL (ref 82–98)
MONOCYTES # BLD AUTO: 0.3 K/UL (ref 0.3–1)
MONOCYTES NFR BLD: 7 % (ref 4–15)
NEUTROPHILS # BLD AUTO: 3.1 K/UL (ref 1.8–7.7)
NEUTROPHILS NFR BLD: 64.7 % (ref 38–73)
NONHDLC SERPL-MCNC: 81 MG/DL
NRBC BLD-RTO: 0 /100 WBC
PLATELET # BLD AUTO: 279 K/UL (ref 150–450)
PMV BLD AUTO: 11 FL (ref 9.2–12.9)
POTASSIUM SERPL-SCNC: 4.8 MMOL/L (ref 3.5–5.1)
PROT SERPL-MCNC: 7.8 G/DL (ref 6–8.4)
RBC # BLD AUTO: 3.98 M/UL (ref 4–5.4)
SODIUM SERPL-SCNC: 136 MMOL/L (ref 136–145)
TRIGL SERPL-MCNC: 46 MG/DL (ref 30–150)
TSH SERPL DL<=0.005 MIU/L-ACNC: 1.23 UIU/ML (ref 0.4–4)
WBC # BLD AUTO: 4.73 K/UL (ref 3.9–12.7)

## 2025-01-15 PROCEDURE — 83036 HEMOGLOBIN GLYCOSYLATED A1C: CPT | Performed by: INTERNAL MEDICINE

## 2025-01-15 PROCEDURE — 99396 PREV VISIT EST AGE 40-64: CPT | Mod: S$GLB,,, | Performed by: INTERNAL MEDICINE

## 2025-01-15 PROCEDURE — 86803 HEPATITIS C AB TEST: CPT | Performed by: INTERNAL MEDICINE

## 2025-01-15 PROCEDURE — 80061 LIPID PANEL: CPT | Performed by: INTERNAL MEDICINE

## 2025-01-15 PROCEDURE — 36415 COLL VENOUS BLD VENIPUNCTURE: CPT | Performed by: INTERNAL MEDICINE

## 2025-01-15 PROCEDURE — 3044F HG A1C LEVEL LT 7.0%: CPT | Mod: CPTII,S$GLB,, | Performed by: INTERNAL MEDICINE

## 2025-01-15 PROCEDURE — 80053 COMPREHEN METABOLIC PANEL: CPT | Performed by: INTERNAL MEDICINE

## 2025-01-15 PROCEDURE — 1159F MED LIST DOCD IN RCRD: CPT | Mod: CPTII,S$GLB,, | Performed by: INTERNAL MEDICINE

## 2025-01-15 PROCEDURE — 99999 PR PBB SHADOW E&M-EST. PATIENT-LVL III: CPT | Mod: PBBFAC,,, | Performed by: INTERNAL MEDICINE

## 2025-01-15 PROCEDURE — 3074F SYST BP LT 130 MM HG: CPT | Mod: CPTII,S$GLB,, | Performed by: INTERNAL MEDICINE

## 2025-01-15 PROCEDURE — 3078F DIAST BP <80 MM HG: CPT | Mod: CPTII,S$GLB,, | Performed by: INTERNAL MEDICINE

## 2025-01-15 PROCEDURE — 85025 COMPLETE CBC W/AUTO DIFF WBC: CPT | Performed by: INTERNAL MEDICINE

## 2025-01-15 PROCEDURE — 84443 ASSAY THYROID STIM HORMONE: CPT | Performed by: INTERNAL MEDICINE

## 2025-01-15 PROCEDURE — 3008F BODY MASS INDEX DOCD: CPT | Mod: CPTII,S$GLB,, | Performed by: INTERNAL MEDICINE

## 2025-01-15 RX ORDER — TAMOXIFEN CITRATE 10 MG/1
10 TABLET ORAL DAILY
COMMUNITY

## 2025-01-15 NOTE — PROGRESS NOTES
Subjective:       Patient ID: Rina Hurtado is a 44 y.o. female.    Chief Complaint: Establish Care    HPI      Presents to establish care.     Has had some issues with urinary incontinence/leakage.  This happens mostly when she is exercising or jumping.  She has done pelvic floor PT in the past.    She was diagnosed with stage IA invasive ductal carcinoma of the left breast in July 2024.  She underwent left breast lumpectomy and sentinel lymph node biopsy in September 2024.  She completed radiation in November 20, 2024.  She was recently started on tamoxifen last week.  Plan is for tamoxifen for 5 years    She has a history of anxiety and was previously on Zoloft but has been doing well without the medication lately.        Health Maintenance:  Mammogram: as above   Colon Cancer Screening: discuss next visit   HIV: negative 2019   Hep C: order today   Lipids: order today   Vaccines:  up to date.     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  Never smoker.    Review of Systems   Constitutional:  Negative for activity change and unexpected weight change.   HENT:  Negative for hearing loss, rhinorrhea and trouble swallowing.    Eyes:  Negative for discharge and visual disturbance.   Respiratory:  Negative for chest tightness and wheezing.    Cardiovascular:  Negative for chest pain and palpitations.   Gastrointestinal:  Negative for blood in stool, constipation, diarrhea and vomiting.   Endocrine: Negative for polydipsia and polyuria.   Genitourinary:  Negative for difficulty urinating, dysuria, hematuria and menstrual problem.   Musculoskeletal:  Negative for arthralgias, joint swelling and neck pain.   Neurological:  Negative for weakness and headaches.   Psychiatric/Behavioral:  Negative for confusion and dysphoric mood.            Past Medical History:   Diagnosis Date    AMA (advanced maternal age) multigravida 35+      No past surgical history on file.   Patient Active Problem List   Diagnosis    Malignant  neoplasm of lower-inner quadrant of left breast in female, estrogen receptor positive        Objective:      Physical Exam  Constitutional:       Appearance: Normal appearance.   HENT:      Head: Normocephalic.   Cardiovascular:      Rate and Rhythm: Normal rate and regular rhythm.      Pulses: Normal pulses.      Heart sounds: Normal heart sounds.   Pulmonary:      Effort: Pulmonary effort is normal.      Breath sounds: Normal breath sounds.   Abdominal:      General: Abdomen is flat. Bowel sounds are normal.      Palpations: Abdomen is soft.   Musculoskeletal:         General: Normal range of motion.      Cervical back: Normal range of motion and neck supple.   Skin:     General: Skin is warm and dry.   Neurological:      General: No focal deficit present.      Mental Status: She is alert and oriented to person, place, and time.   Psychiatric:         Mood and Affect: Mood normal.         Assessment:       Problem List Items Addressed This Visit          Oncology    Malignant neoplasm of lower-inner quadrant of left breast in female, estrogen receptor positive     Other Visit Diagnoses       Encounter for annual physical exam    -  Primary    Relevant Orders    Comprehensive Metabolic Panel (Completed)    CBC Auto Differential (Completed)    Lipid Panel (Completed)    Hemoglobin A1C (Completed)    TSH (Completed)    Hepatitis C antibody (Completed)    Stress incontinence of urine        Relevant Orders    Ambulatory Referral/Consult to Physical Therapy            Plan:         Rina was seen today for establish care.    Diagnoses and all orders for this visit:    Encounter for annual physical exam  Mammogram: as above   Colon Cancer Screening: discuss next visit   HIV: negative 2019   Hep C: order today   Lipids: order today   Vaccines:  up to date.   Annual wellness exam completed.     All medications, histories, and concerns reviewed, reconciled, and addressed.     Appropriate Screenings per pt's sex and age  have been reviewed and discussed with pt.       Stress incontinence of urine  -     Ambulatory Referral/Consult to Physical Therapy; Future  We will refer to pelvic floor therapy.    Malignant neoplasm of lower-inner quadrant of left breast in female, estrogen receptor positive  Continue tamoxifen.  She has tolerating so far.  Follow up Oncology               Mirna Diehl MD   Internal Medicine   Primary Care

## 2025-01-16 ENCOUNTER — PATIENT MESSAGE (OUTPATIENT)
Dept: INTERNAL MEDICINE | Facility: CLINIC | Age: 45
End: 2025-01-16
Payer: COMMERCIAL

## 2025-01-16 DIAGNOSIS — Z12.11 ENCOUNTER FOR COLORECTAL CANCER SCREENING: Primary | ICD-10-CM

## 2025-01-16 DIAGNOSIS — Z12.12 ENCOUNTER FOR COLORECTAL CANCER SCREENING: Primary | ICD-10-CM

## 2025-01-28 ENCOUNTER — TELEPHONE (OUTPATIENT)
Dept: ENDOSCOPY | Facility: HOSPITAL | Age: 45
End: 2025-01-28

## 2025-01-28 ENCOUNTER — CLINICAL SUPPORT (OUTPATIENT)
Dept: ENDOSCOPY | Facility: HOSPITAL | Age: 45
End: 2025-01-28
Payer: COMMERCIAL

## 2025-01-28 VITALS — BODY MASS INDEX: 24.29 KG/M2 | HEIGHT: 62 IN | WEIGHT: 132 LBS

## 2025-01-28 DIAGNOSIS — Z12.11 ENCOUNTER FOR COLORECTAL CANCER SCREENING: ICD-10-CM

## 2025-01-28 DIAGNOSIS — Z12.12 ENCOUNTER FOR COLORECTAL CANCER SCREENING: ICD-10-CM

## 2025-01-28 RX ORDER — SODIUM, POTASSIUM,MAG SULFATES 17.5-3.13G
1 SOLUTION, RECONSTITUTED, ORAL ORAL DAILY
Qty: 1 KIT | Refills: 0 | Status: SHIPPED | OUTPATIENT
Start: 2025-01-28 | End: 2025-01-30

## 2025-01-28 NOTE — TELEPHONE ENCOUNTER
Referral for procedure from PAT appointment      Spoke to patient to schedule procedure(s) Colonoscopy       Physician to perform procedure(s)  Physician, colonoscopy  Date of Procedure (s) 03/18/25  Arrival Time 09:00 AM  Time of Procedure(s) 10:00 AM   Location of Procedure(s) [facility] [address] Floor  Type of Rx Prep sent to patient: Suprep  Instructions provided to patient via [facility]    Patient was informed on the following information and verbalized understanding. Screening questionnaire reviewed with patient and complete. If procedure requires anesthesia, a responsible adult needs to be present to accompany the patient home, patient cannot drive after receiving anesthesia. Appointment details are tentative, especially check-in time. Patient will receive a prep-op call 7 days prior to confirm check-in time for procedure. If applicable the patient should contact their pharmacy to verify Rx for procedure prep is ready for pick-up. Patient was advised to call the scheduling department at [phone] if pharmacy states no Rx is available. Patient was advised to call the endoscopy scheduling department if any questions or concerns arise.      [facility] Scheduling Department

## 2025-02-11 ENCOUNTER — CLINICAL SUPPORT (OUTPATIENT)
Dept: REHABILITATION | Facility: OTHER | Age: 45
End: 2025-02-11
Payer: COMMERCIAL

## 2025-02-11 DIAGNOSIS — R27.8 COORDINATION IMPAIRMENT: ICD-10-CM

## 2025-02-11 DIAGNOSIS — N39.3 STRESS INCONTINENCE OF URINE: ICD-10-CM

## 2025-02-11 DIAGNOSIS — N81.89 PELVIC FLOOR WEAKNESS: Primary | ICD-10-CM

## 2025-02-11 PROCEDURE — 97112 NEUROMUSCULAR REEDUCATION: CPT

## 2025-02-11 PROCEDURE — 97530 THERAPEUTIC ACTIVITIES: CPT

## 2025-02-11 PROCEDURE — 97162 PT EVAL MOD COMPLEX 30 MIN: CPT

## 2025-02-11 NOTE — PROGRESS NOTES
Outpatient Rehab    Physical Therapy Evaluation    Patient Name: Rina Hurtado  MRN: [id number]  YOB: 1980  Today's Date: 2/11/2025    Therapy Diagnosis:   Encounter Diagnoses   Name Primary?    Stress incontinence of urine     Pelvic floor weakness Yes    Coordination impairment      Physician: Mirna Diehl MD    Physician Orders: Eval and Treat  Medical Diagnosis: N39.3 (ICD-10-CM) - Stress incontinence of urine     Visit # / Visits Authorized:  1 / 1   Date of Evaluation:  2/11/2025   Insurance Authorization Period: 1/15/2024 to 1/15/2025  Plan of Care Certification:  2/11/2025 to 5/11/2025      Time In: 1550   Time Out: 1645  Total Time: 55   Total Billable Time: 55    Intake Outcome Measure for FOTO Survey    Therapist reviewed FOTO scores for Rina Hurtado on 2/11/2025.   FOTO report - see Media section or FOTO account episode details.     Intake Score:  %         Subjective   History of Present Illness  Rina is a 44 y.o. female who reports to physical therapy with a chief concern of Urinary Leakage. According to the patient's chart, Rina has a past medical history of AMA (advanced maternal age) multigravida 35+. Rina has no past surgical history on file.                History of Present Condition/Illness: Has two kids; delivered first in 2015 and second in 2019. Leakage began in 2015, but has recently worsened. CESAR occurs with jumping and running. Pelvic floor physical therapy completed in 2020, though she did not notice improvement.     Activities of Daily Living  Social history was obtained from Patient.       General Current Level of Function Comments: Exercise limited by urinary leakage       Previously independent with activities of daily living? Yes     Currently independent with activities of daily living? Yes          Previously independent with instrumental activities of daily living? Yes     Currently independent with instrumental activities of daily living?  Yes              Pain  No Pain Reported: Yes                 Bladder/Bowel Habits and Symptoms  Bladder Habits  Urine Stream: Normal  Bladder Emptying: Complete  Frequency of Bladder Urgency: Never  Urinary Urge/Sensation: Normal  Ability to Delay Urination: 1 hour  Daytime Frequency of Urination: every 2 hours  Nighttime Frequency of Urination: 1+  Estimated Fluid Intake: 60-80 oz water, sparkling water, tea, coffee    Urinary Symptoms  Urine Leakage Amount: Small  Frequency of Urinary Incontinence: Multiple times per week  Urinary Incontinence Aggravating Factors: Exercising, Sneezing, Coughing  Able to Stop the Flow of Urine: Yes    Bladder or Bowel Leakage Protection  Protection for Leakage: None        Treatment History  Treatments  Previously Received Treatments: Yes  Previous Treatments: Physical therapy  Currently Receiving Treatments: No    Personal Factors   The patient's physical activity or sport participation includes: HIIT, pilates, walking and occasional running On average, the number of days per week the patient engages in moderate to strenuous exercise, like a brisk walk, is 5 days.      When asked about history of sexual abuse, the patient's response was No.        Living Arrangements  Living Situation  Housing: Home independently  Living Arrangements: Children  Support Systems: Spouse/significant other        Employment  Patient does not report that: Does the patient's condition impact their ability to work?  Employment Status: Employed full-time          Past Medical History/Physical Systems Review:   Rina Hurtado  has a past medical history of AMA (advanced maternal age) multigravida 35+.    Rina Hurtado  has no past surgical history on file.    Rina has a current medication list which includes the following prescription(s): tamoxifen.    Review of patient's allergies indicates:  No Known Allergies     Objective   Posture                 Bilaterally, hips are: Internally  Rotated       Pelvic External Observations  Consent for Examination: Yes, Chaperone Present: No    Abdominal Assessment  Diastasis Rectus Abdominis Present: Yes  Diastasis Rectus Abdominis Tissue Integrity: Firm  Diastasis Rectus Abdominis Observation: Hollowing  Diastasis Rectus Abdominis Measurement 3 cm Above Umbilicus (cm): 1  Diastasis Rectus Abdominis Measurement at Umbilicus (cm): 1  Diastasis Rectus Abdominis Measurement 3 cm Below Umbilicus (cm): 1  Transversus Abdominis Contraction: Weak    Pelvic Assessment  Perineal Skin Quality: Unremarkable  Perineal Body Length: Less than or equal to 3 centimeters  Volitional Contraction: Present  Volitional Relaxation: Present  Volitional Bearing Down: Incomplete  Pelvic Floor Cough Reaction: Descent          Pelvic Floor Palpation  Pelvic Floor Exams Performed: External Pelvic and Internal Vaginal       Right External Pelvic Floor and Rectal Palpation  Unremarkable: Layer 1, Layer 2, Layer 3, and Rectal          Left External Pelvic Floor and Rectal Palpation  Unremarkable: Layer 1, Layer 2, Layer 3, and Rectal          Right Internal Pelvic Floor and Rectal Palpation  Abnormal: Layer 3  Right Pelvic  3 Internal Palpation Observations: limited recruitment  Tension WNL, No TTP    Left Internal Pelvic Floor and Rectal Palpation  Abnormal: Layer 3  Left Pelvic  3 Internal Palpation Observations: limited recruitment  Tension WNL, No TTP        Pelvic Floor Special Tests  Single Digit Intravaginal Assessment  Intravaginal Pelvic Floor Strength: 3  Intravaginal Pelvic Floor Endurance (sec): 5  Intravaginal Pelvic Floor Repetitions: 4  Intravaginal Pelvic Floor Quick Flicks: 6  Intravaginal Pelvic Floor Co-Contraction Substitution: Absent  Intravaginal Pelvic Floor Relaxation Response: Normal  Anterior Vaginal Wall Laxity: Grade 1 anterior prolapse noted  Right Vaginal Sensation: Intact  Left Vaginal Sensation: Intact              Treatment:  Balance/Neuromuscular Re-Education  Balance/Neuromuscular Re-Education Activity 1: pelvic floor awareness, mobilization  Balance/Neuromuscular Re-Education Activity 2: coordinated kegel + breathing  Balance/Neuromuscular Re-Education Activity 3: layer-specific pelvic floor recruitment    Therapeutic Activity  Therapeutic Activity 1: role of PT, plan of care, involved anatomy and pathology,  goals, rational for treatment and exercise, scheduling limitations  Therapeutic Activity 2: pressure management    Assessment & Plan   Assessment  Rina presents with a condition of Moderate complexity.   Presentation of Symptoms: Stable  Will Comorbidities Impact Care: Yes  History of breast cancer, currently taking Tamoxifen     Functional Limitations: Activity tolerance, Participating in leisure activities, Participating in sports  Impairments: Abnormal muscle firing, Impaired physical strength, Lack of appropriate home exercise program    Patient Goal for Therapy (PT): Return to exercise without leakage  Prognosis: Good  Prognosis Details: Limited by chronicity of condition  Assessment Details: Rina  presents with altered posture, poor trunk stability, decreased pelvic muscle strength, decreased endurance of the pelvic muscles, poor quality of pelvic muscle contraction, and poor coordination of pelvic floor muscles during ADL's leading to urinary or fecal leakage. Based on presentation, limitations in abdominopelvic strength and coordination have likely contributed to pelvic organ prolapse and urinary incontinence. Rina  would benefit from continued pelvic floor physical therapy.     Plan  From a physical therapy perspective, the patient would benefit from: Skilled Rehab Services    Planned therapy interventions include: Therapeutic exercise, Therapeutic activities, Neuromuscular re-education, and Manual therapy.    Planned modalities to include: Biofeedback, Electrical stimulation - attended,  Ultrasound, and Other (Comment). Dry needling      Visit Frequency: 1 times Per Week for 10 Weeks.       This plan was discussed with Patient.   Discussion participants: Agreed Upon Plan of Care             Patient's spiritual, cultural, and educational needs considered and patient agreeable to plan of care and goals.           Goals:   Active       Long Term Goals       Pt to be able to perform a 10 second kegel x 10 reps to demonstrate improving strength and endurance needed for continence.        Start:  02/11/25    Expected End:  04/22/25            Pt to report reduced incontinence with exercise to no more than 10%,  demonstrating improved pelvic floor muscle strength and coordination         Start:  02/11/25    Expected End:  04/22/25            Pt to increase transverse abdominus strength to at least 4/5 to demonstrate improved strength needed for continence with ADLs.          Start:  02/11/25    Expected End:  04/22/25               Short Term Goals       Pt to be able to perform a 5  second kegel x 10 reps to demonstrate improving strength and endurance needed for continence.        Start:  02/11/25    Expected End:  03/18/25            Pt to report being able to sneeze without incontinence demonstrating improved pelvic floor strength and coordination to improve confidence in social situations        Start:  02/11/25    Expected End:  03/18/25            Pt to increase strength of pelvic floor muscles to 4/5 to allow for improvement in urinary continence with exercise.         Start:  02/11/25    Expected End:  03/18/25                CORTNEY GALVAN, PT

## 2025-02-11 NOTE — PATIENT INSTRUCTIONS
Long Holds  Hold each squeeze for 4 seconds. Rest 4 seconds.   Repeat 5 times. Perform 2 sets/day.     Quick Flicks  Hold 1 second. Rest 1 second (or to complete relaxation)   Repeat 5 times at each layer. Perform 2 sets/day.

## 2025-02-24 ENCOUNTER — CLINICAL SUPPORT (OUTPATIENT)
Dept: REHABILITATION | Facility: OTHER | Age: 45
End: 2025-02-24
Payer: COMMERCIAL

## 2025-02-24 DIAGNOSIS — R27.8 COORDINATION IMPAIRMENT: ICD-10-CM

## 2025-02-24 DIAGNOSIS — N81.89 PELVIC FLOOR WEAKNESS: Primary | ICD-10-CM

## 2025-02-24 PROCEDURE — 97112 NEUROMUSCULAR REEDUCATION: CPT

## 2025-02-24 NOTE — PROGRESS NOTES
Outpatient Rehab    Physical Therapy Visit    Patient Name: Rina Hurtado  MRN: [id number]  YOB: 1980  Encounter Date: 2/24/2025    Therapy Diagnosis:   Encounter Diagnoses   Name Primary?    Pelvic floor weakness Yes    Coordination impairment      Physician: Mirna Diehl MD    Physician Orders: Eval and Treat  Medical Diagnosis: N39.3 (ICD-10-CM) - Stress incontinence of urine      Visit # / Visits Authorized:  1 / 20  Date of Evaluation:  2/11/2025   Insurance Authorization Period: 1/15/2024 to 1/15/2025  Plan of Care Certification:  2/11/2025 to 5/11/2025      Time In: 0900   Time Out: 0935  Total Time: 35   Total Billable Time: 35    FOTO:  Intake Score:  %  Survey Score 1:  %  Survey Score 2:  %         Subjective   Is unsure if she is performing exercises correctly.         Objective            Treatment:  Therapeutic Exercise  Therapeutic Exercise Activity 1: kegel holds - 4 sec x10         Balance/Neuromuscular Re-Education  Balance/Neuromuscular Re-Education Activity 1: layer-specific pelvic floor recruitment - focus on layer 2  Balance/Neuromuscular Re-Education Activity 2: layer 2 recruitment + hip adductor set in HL  Balance/Neuromuscular Re-Education Activity 3: kegel + TA bracing x3  Balance/Neuromuscular Re-Education Activity 4: kegel + TA bracing + heel slide x3  Balance/Neuromuscular Re-Education Activity 5: kegel + TA bracing + BKFO x3  Balance/Neuromuscular Re-Education Activity 6: kegel + TA bracing + march x3         Assessment & Plan   Assessment: Decreased recruitment at layer 2 noted compared to layers 1 and 3. Adductor co-contraction assissted with increased recruitment. 7 repetitions of 4 second holds performed prior to muscle fatigue; HEP updated accordingly. TA and pelvic floor co-contractions also added with good PFM awareness demonstrated; however performance remains limited by poor muscle endurance.  Evaluation/Treatment Tolerance: Patient tolerated treatment  well    Patient will continue to benefit from skilled outpatient physical therapy to address the deficits listed in the problem list box on initial evaluation, provide pt/family education and to maximize pt's level of independence in the home and community environment.     Patient's spiritual, cultural, and educational needs considered and patient agreeable to plan of care and goals.           Plan: review and progress TA co-contractions    Goals:   Active       Long Term Goals       Pt to be able to perform a 10 second kegel x 10 reps to demonstrate improving strength and endurance needed for continence.  (Progressing)       Start:  02/11/25    Expected End:  04/22/25            Pt to report reduced incontinence with exercise to no more than 10%,  demonstrating improved pelvic floor muscle strength and coordination   (Progressing)       Start:  02/11/25    Expected End:  04/22/25            Pt to increase transverse abdominus strength to at least 4/5 to demonstrate improved strength needed for continence with ADLs.    (Progressing)       Start:  02/11/25    Expected End:  04/22/25               Short Term Goals       Pt to be able to perform a 5  second kegel x 10 reps to demonstrate improving strength and endurance needed for continence.  (Progressing)       Start:  02/11/25    Expected End:  03/18/25            Pt to report being able to sneeze without incontinence demonstrating improved pelvic floor strength and coordination to improve confidence in social situations  (Progressing)       Start:  02/11/25    Expected End:  03/18/25            Pt to increase strength of pelvic floor muscles to 4/5 to allow for improvement in urinary continence with exercise.   (Progressing)       Start:  02/11/25    Expected End:  03/18/25                CORTNEY GALVAN, PT

## 2025-02-24 NOTE — PATIENT INSTRUCTIONS
Long Holds  Hold each squeeze for 4 seconds. Rest 4 seconds.   Repeat 7+ times. Perform 2 sets/day.     Quick Flicks  Hold 1 second. Rest 1 second (or to complete relaxation)  Repeat 7+ times. Perform 2 sets/day.

## 2025-03-12 ENCOUNTER — CLINICAL SUPPORT (OUTPATIENT)
Dept: REHABILITATION | Facility: OTHER | Age: 45
End: 2025-03-12
Payer: COMMERCIAL

## 2025-03-12 DIAGNOSIS — N81.89 PELVIC FLOOR WEAKNESS: Primary | ICD-10-CM

## 2025-03-12 DIAGNOSIS — R27.8 COORDINATION IMPAIRMENT: ICD-10-CM

## 2025-03-12 PROCEDURE — 97110 THERAPEUTIC EXERCISES: CPT

## 2025-03-12 PROCEDURE — 97112 NEUROMUSCULAR REEDUCATION: CPT

## 2025-03-12 NOTE — PROGRESS NOTES
Outpatient Rehab    Physical Therapy Visit    Patient Name: Rina Hurtado  MRN: [id number]  YOB: 1980  Encounter Date: 3/12/2025    Therapy Diagnosis:   Encounter Diagnoses   Name Primary?    Pelvic floor weakness Yes    Coordination impairment      Physician: Mirna Diehl MD    Physician Orders: Eval and Treat  Medical Diagnosis: N39.3 (ICD-10-CM) - Stress incontinence of urine      Visit # / Visits Authorized:  2 / 20  Date of Evaluation:  2/11/2025   Insurance Authorization Period: 1/15/2024 to 1/15/2025  Plan of Care Certification:  2/11/2025 to 5/11/2025      Time In: 1150   Time Out: 1220  Total Time: 30   Total Billable Time: 30    FOTO:  Intake Score:  %  Survey Score 1:  %  Survey Score 2:  %         Subjective   Is less aware of pelvic floor activation with dynamic exercises. Marching is easier than heel slides and bent knee fall out..         Objective            Treatment:  Therapeutic Exercise  Therapeutic Exercise Activity 1: SL hip ABD  Therapeutic Exercise Activity 2: Single leg thruster  Therapeutic Exercise Activity 3: Side plank + hip ER         Balance/Neuromuscular Re-Education  Balance/Neuromuscular Re-Education Activity 1: kegel + TA set in quadruped >> quadruped series x10 ea         Assessment & Plan   Assessment: Improved pelvic floor awareness noted with quadruped transverse abdominus co-activation compared to supine. However, weakness in L hip external rotators, abductors noted with quadruped hip external rotation. Based on performance, static hip strengthening exercises also added. Good performance noted with all; HEP updated to include. Plan to reassess and progress NV.  Evaluation/Treatment Tolerance: Patient tolerated treatment well    Patient will continue to benefit from skilled outpatient physical therapy to address the deficits listed in the problem list box on initial evaluation, provide pt/family education and to maximize pt's level of independence in  the home and community environment.     Patient's spiritual, cultural, and educational needs considered and patient agreeable to plan of care and goals.           Plan: review quadruped hip ER, progress dynamic hip strengthening    Goals:   Active       Long Term Goals       Pt to be able to perform a 10 second kegel x 10 reps to demonstrate improving strength and endurance needed for continence.  (Progressing)       Start:  02/11/25    Expected End:  04/22/25            Pt to report reduced incontinence with exercise to no more than 10%,  demonstrating improved pelvic floor muscle strength and coordination   (Progressing)       Start:  02/11/25    Expected End:  04/22/25            Pt to increase transverse abdominus strength to at least 4/5 to demonstrate improved strength needed for continence with ADLs.    (Progressing)       Start:  02/11/25    Expected End:  04/22/25               Short Term Goals       Pt to be able to perform a 5  second kegel x 10 reps to demonstrate improving strength and endurance needed for continence.  (Progressing)       Start:  02/11/25    Expected End:  03/18/25            Pt to report being able to sneeze without incontinence demonstrating improved pelvic floor strength and coordination to improve confidence in social situations  (Progressing)       Start:  02/11/25    Expected End:  03/18/25            Pt to increase strength of pelvic floor muscles to 4/5 to allow for improvement in urinary continence with exercise.   (Progressing)       Start:  02/11/25    Expected End:  03/18/25                CORTNEY GALVAN, PT

## 2025-03-17 ENCOUNTER — NURSE TRIAGE (OUTPATIENT)
Dept: ADMINISTRATIVE | Facility: CLINIC | Age: 45
End: 2025-03-17
Payer: COMMERCIAL

## 2025-03-17 NOTE — TELEPHONE ENCOUNTER
Error Encounter      Reason for Disposition   Caller has already spoken with another triager and has no further questions.    Protocols used: No Contact or Duplicate Contact Call-A-AH

## 2025-03-17 NOTE — TELEPHONE ENCOUNTER
Speaking with pt who has scheduled colonoscopy tomorrow is taking suprep. States that she vomited 2/3 of first bottle. Deneis any other symptoms at this time      Spoke with Dr. Mota who states that if unable to tolerate prep she will have to order a new prep, and reschedule.    Pt informed, and verbalized understanding.    Reason for Disposition   [1] Follow-up call from patient regarding patient's clinical status AND [2] information urgent    Protocols used: PCP Call - No Triage-A-

## 2025-03-18 ENCOUNTER — HOSPITAL ENCOUNTER (OUTPATIENT)
Facility: HOSPITAL | Age: 45
Discharge: HOME | End: 2025-03-18
Attending: INTERNAL MEDICINE | Admitting: INTERNAL MEDICINE
Payer: COMMERCIAL

## 2025-03-18 ENCOUNTER — ANESTHESIA EVENT (OUTPATIENT)
Dept: ENDOSCOPY | Facility: HOSPITAL | Age: 45
End: 2025-03-18
Payer: COMMERCIAL

## 2025-03-18 ENCOUNTER — ANESTHESIA (OUTPATIENT)
Dept: ENDOSCOPY | Facility: HOSPITAL | Age: 45
End: 2025-03-18
Payer: COMMERCIAL

## 2025-03-18 VITALS
WEIGHT: 133.19 LBS | RESPIRATION RATE: 16 BRPM | SYSTOLIC BLOOD PRESSURE: 97 MMHG | HEIGHT: 62 IN | DIASTOLIC BLOOD PRESSURE: 53 MMHG | OXYGEN SATURATION: 100 % | HEART RATE: 59 BPM | TEMPERATURE: 98 F | BODY MASS INDEX: 24.51 KG/M2

## 2025-03-18 DIAGNOSIS — Z12.11 ENCOUNTER FOR COLORECTAL CANCER SCREENING: Primary | ICD-10-CM

## 2025-03-18 DIAGNOSIS — Z12.12 ENCOUNTER FOR COLORECTAL CANCER SCREENING: Primary | ICD-10-CM

## 2025-03-18 PROCEDURE — 88305 TISSUE EXAM BY PATHOLOGIST: CPT | Performed by: STUDENT IN AN ORGANIZED HEALTH CARE EDUCATION/TRAINING PROGRAM

## 2025-03-18 PROCEDURE — 27201089 HC SNARE, DISP (ANY): Performed by: INTERNAL MEDICINE

## 2025-03-18 PROCEDURE — 37000008 HC ANESTHESIA 1ST 15 MINUTES: Performed by: INTERNAL MEDICINE

## 2025-03-18 PROCEDURE — 63600175 PHARM REV CODE 636 W HCPCS

## 2025-03-18 PROCEDURE — 88305 TISSUE EXAM BY PATHOLOGIST: CPT | Mod: 26,,, | Performed by: STUDENT IN AN ORGANIZED HEALTH CARE EDUCATION/TRAINING PROGRAM

## 2025-03-18 PROCEDURE — 25000003 PHARM REV CODE 250

## 2025-03-18 PROCEDURE — 45385 COLONOSCOPY W/LESION REMOVAL: CPT | Mod: 33 | Performed by: INTERNAL MEDICINE

## 2025-03-18 PROCEDURE — 37000009 HC ANESTHESIA EA ADD 15 MINS: Performed by: INTERNAL MEDICINE

## 2025-03-18 PROCEDURE — 45385 COLONOSCOPY W/LESION REMOVAL: CPT | Mod: 33,,, | Performed by: INTERNAL MEDICINE

## 2025-03-18 RX ORDER — DEXAMETHASONE SODIUM PHOSPHATE 4 MG/ML
INJECTION, SOLUTION INTRA-ARTICULAR; INTRALESIONAL; INTRAMUSCULAR; INTRAVENOUS; SOFT TISSUE
Status: DISCONTINUED | OUTPATIENT
Start: 2025-03-18 | End: 2025-03-18

## 2025-03-18 RX ORDER — PROPOFOL 10 MG/ML
VIAL (ML) INTRAVENOUS
Status: DISCONTINUED | OUTPATIENT
Start: 2025-03-18 | End: 2025-03-18

## 2025-03-18 RX ORDER — SODIUM CHLORIDE 9 MG/ML
INJECTION, SOLUTION INTRAVENOUS CONTINUOUS
Status: DISCONTINUED | OUTPATIENT
Start: 2025-03-18 | End: 2025-03-18 | Stop reason: HOSPADM

## 2025-03-18 RX ORDER — PHENYLEPHRINE HYDROCHLORIDE 10 MG/ML
INJECTION INTRAVENOUS
Status: DISCONTINUED | OUTPATIENT
Start: 2025-03-18 | End: 2025-03-18

## 2025-03-18 RX ORDER — ONDANSETRON HYDROCHLORIDE 4 MG/5ML
4 SOLUTION ORAL ONCE
Status: DISCONTINUED | OUTPATIENT
Start: 2025-03-18 | End: 2025-03-18 | Stop reason: HOSPADM

## 2025-03-18 RX ORDER — LIDOCAINE HYDROCHLORIDE 20 MG/ML
INJECTION INTRAVENOUS
Status: DISCONTINUED | OUTPATIENT
Start: 2025-03-18 | End: 2025-03-18

## 2025-03-18 RX ORDER — ONDANSETRON HYDROCHLORIDE 2 MG/ML
INJECTION, SOLUTION INTRAVENOUS
Status: DISCONTINUED | OUTPATIENT
Start: 2025-03-18 | End: 2025-03-18

## 2025-03-18 RX ADMIN — ONDANSETRON 4 MG: 2 INJECTION INTRAMUSCULAR; INTRAVENOUS at 09:03

## 2025-03-18 RX ADMIN — PROPOFOL 75 MG: 10 INJECTION, EMULSION INTRAVENOUS at 09:03

## 2025-03-18 RX ADMIN — PHENYLEPHRINE HYDROCHLORIDE 200 MCG: 10 INJECTION INTRAVENOUS at 09:03

## 2025-03-18 RX ADMIN — LIDOCAINE HYDROCHLORIDE 50 MG: 20 INJECTION INTRAVENOUS at 09:03

## 2025-03-18 RX ADMIN — PROPOFOL 150 MCG/KG/MIN: 10 INJECTION, EMULSION INTRAVENOUS at 09:03

## 2025-03-18 RX ADMIN — SODIUM CHLORIDE: 0.9 INJECTION, SOLUTION INTRAVENOUS at 09:03

## 2025-03-18 RX ADMIN — DEXAMETHASONE SODIUM PHOSPHATE 4 MG: 4 INJECTION, SOLUTION INTRAMUSCULAR; INTRAVENOUS at 09:03

## 2025-03-18 RX ADMIN — GLYCOPYRROLATE 0.2 MG: 0.2 INJECTION, SOLUTION INTRAMUSCULAR; INTRAVENOUS at 09:03

## 2025-03-18 NOTE — ANESTHESIA POSTPROCEDURE EVALUATION
Anesthesia Post Evaluation    Patient: Rina Hurtado    Procedure(s) Performed: Procedure(s) (LRB):  COLONOSCOPY, SCREENING, LOW RISK PATIENT (N/A)    Final Anesthesia Type: general      Patient location during evaluation: GI PACU  Patient participation: Yes- Able to Participate  Level of consciousness: awake  Post-procedure vital signs: reviewed and stable  Pain management: adequate  Airway patency: patent    PONV status at discharge: No PONV  Anesthetic complications: no      Cardiovascular status: blood pressure returned to baseline  Respiratory status: unassisted, spontaneous ventilation and room air                Vitals Value Taken Time   BP 97/53 03/18/25 10:27   Temp 36.7 °C (98.1 °F) 03/18/25 10:10   Pulse 59 03/18/25 10:27   Resp 16 03/18/25 10:27   SpO2 100 % 03/18/25 10:27         No case tracking events are documented in the log.      Pain/Vern Score: Vern Score: 10 (3/18/2025 10:26 AM)

## 2025-03-18 NOTE — TRANSFER OF CARE
"Anesthesia Transfer of Care Note    Patient: Rina Hurtado    Procedure(s) Performed: Procedure(s) (LRB):  COLONOSCOPY, SCREENING, LOW RISK PATIENT (N/A)    Patient location: GI    Anesthesia Type: general    Transport from OR: Transported from OR on room air with adequate spontaneous ventilation    Post pain: adequate analgesia    Post assessment: no apparent anesthetic complications and tolerated procedure well    Post vital signs: stable    Level of consciousness: sedated    Nausea/Vomiting: no nausea/vomiting    Complications: none    Transfer of care protocol was followed      Last vitals: Visit Vitals  BP (!) 93/52 (BP Location: Left arm, Patient Position: Lying)   Pulse (!) 58   Temp 37 °C (98.6 °F) (Tympanic)   Resp 16   Ht 5' 2" (1.575 m)   Wt 60.4 kg (133 lb 2.5 oz)   LMP 03/16/2025 (Exact Date)   SpO2 100%   Breastfeeding No   BMI 24.35 kg/m²     "

## 2025-03-18 NOTE — PROVATION PATIENT INSTRUCTIONS
Discharge Summary/Instructions after an Endoscopic Procedure  Patient Name: Rina Hurtado  Patient MRN: [id number]  Patient YOB: 1980 Tuesday, March 18, 2025  Singh Altamirano MD  Dear patient,  As a result of recent federal legislation (The Federal Cures Act), you may   receive lab or pathology results from your procedure in your [facility]   account before your physician is able to contact you. Your physician or   their representative will relay the results to you with their   recommendations at their soonest availability.  Thank you,  RESTRICTIONS:  During your procedure today, you received medications for sedation.  These   medications may affect your judgment, balance and coordination.  Therefore,   for 24 hours, you have the following restrictions:   - DO NOT drive a car, operate machinery, make legal/financial decisions,   sign important papers or drink alcohol.    ACTIVITY:  Today: no heavy lifting, straining or running due to procedural   sedation/anesthesia.  The following day: return to full activity including work.  DIET:  Eat and drink normally unless instructed otherwise.     TREATMENT FOR COMMON SIDE EFFECTS:  - Mild abdominal pain, nausea, belching, bloating or excessive gas:  rest,   eat lightly and use a heating pad.  - Sore Throat: treat with throat lozenges and/or gargle with warm salt   water.  - Because air was used during the procedure, expelling large amounts of air   from your rectum or belching is normal.  - If a bowel prep was taken, you may not have a bowel movement for 1-3 days.    This is normal.  SYMPTOMS TO WATCH FOR AND REPORT TO YOUR PHYSICIAN:  1. Abdominal pain or bloating, other than gas cramps.  2. Chest pain.  3. Back pain.  4. Signs of infection such as: chills or fever occurring within 24 hours   after the procedure.  5. Rectal bleeding, which would show as bright red, maroon, or black stools.   (A tablespoon of blood from the rectum is not serious,  especially if   hemorrhoids are present.)  6. Vomiting.  7. Weakness or dizziness.  GO DIRECTLY TO THE NEAREST EMERGENCY ROOM IF YOU HAVE ANY OF THE FOLLOWING:      Difficulty breathing              Chills and/or fever over 101 F   Persistent vomiting and/or vomiting blood   Severe abdominal pain   Severe chest pain   Black, tarry stools   Bleeding- more than one tablespoon   Any other symptom or condition that you feel may need urgent attention  Your doctor recommends these additional instructions:  If any biopsies were taken, your doctors clinic will contact you in 1 to 2   weeks with any results.  - Discharge patient to home (ambulatory).   - Patient has a contact number available for emergencies.  The signs and   symptoms of potential delayed complications were discussed with the   patient.  Return to normal activities tomorrow.  Written discharge   instructions were provided to the patient.   - Resume previous diet.   - Continue present medications.   - Await pathology results.   - Repeat colonoscopy in 1 year for surveillance after piecemeal polypectomy.     - Return to referring physician as previously scheduled.  For questions, problems or results please call your physician - Singh Altamirano MD at Work:  [phone].  [facility] [place], EMERGENCY ROOM PHONE NUMBER: [phone]  IF A COMPLICATION OR EMERGENCY SITUATION ARISES AND YOU ARE UNABLE TO REACH   YOUR PHYSICIAN - GO DIRECTLY TO THE EMERGENCY ROOM.  MD Singh Montalvo MD  3/18/2025 10:07:18 AM  This report has been verified and signed electronically.  Dear patient,  As a result of recent federal legislation (The Federal Cures Act), you may   receive lab or pathology results from your procedure in your [facility]   account before your physician is able to contact you. Your physician or   their representative will relay the results to you with their   recommendations at their soonest availability.  Thank  you,  PROVATION

## 2025-03-18 NOTE — ANESTHESIA PREPROCEDURE EVALUATION
03/18/2025  Rina Hurtado is a 44 y.o., female.  Past Medical History:   Diagnosis Date    AMA (advanced maternal age) multigravida 35+      No past surgical history on file.        Pre-op Assessment    I have reviewed the Patient Summary Reports.     I have reviewed the Nursing Notes. I have reviewed the NPO Status.   I have reviewed the Medications.     Review of Systems  Anesthesia Hx:  No problems with previous Anesthesia                Social:  Social Alcohol Use, Non-Smoker       Cardiovascular:                   ECG has been reviewed.                            Hepatic/GI:  Bowel Prep.                       Physical Exam  General: Well nourished, Cooperative, Alert and Oriented    Airway:  Mallampati: II / I  Mouth Opening: Normal  TM Distance: Normal  Tongue: Normal  Neck ROM: Normal ROM    Dental:  Intact    Chest/Lungs:  Clear to auscultation, Normal Respiratory Rate    Heart:  Rate: Normal  Rhythm: Regular Rhythm  Sounds: Normal        Anesthesia Plan  Type of Anesthesia, risks & benefits discussed:    Anesthesia Type: Gen Natural Airway  Intra-op Monitoring Plan: Standard ASA Monitors  Induction:  IV  Informed Consent: Informed consent signed with the Patient and all parties understand the risks and agree with anesthesia plan.  All questions answered.   ASA Score: 1  Day of Surgery Review of History & Physical: H&P Update referred to the surgeon/provider.    Ready For Surgery From Anesthesia Perspective.     .

## 2025-03-18 NOTE — H&P
Short Stay Endoscopy History and Physical    PCP - Mirna Diehl MD  Referring Physician - Mirna Diehl MD  [address] [place] [address]    Procedure - Colonoscopy  ASA - per anesthesia  Mallampati - per anesthesia  History of Anesthesia problems - no  Family history Anesthesia problems -  no   Plan of anesthesia - General    HPI  44 y.o. female  Reason for procedure:   Encounter for colorectal cancer screening [Z12.11, Z12.12]        ROS:  Constitutional: No fevers, chills, No weight loss  CV: No chest pain  Pulm: No cough, No shortness of breath  GI: see HPI    Medical History:  has a past medical history of AMA (advanced maternal age) multigravida 35+.    Surgical History:  has no past surgical history on file.    Family History: family history includes Diabetes in her mother; No Known Problems in her brother and father..    Social History:  reports that she has never smoked. She has never used smokeless tobacco. She reports current alcohol use. She reports that she does not use drugs.    Review of patient's allergies indicates:  No Known Allergies    Medications:   Prescriptions Prior to Admission[1]    Physical Exam:    Vital Signs:   Vitals:    03/18/25 0905   BP: (!) 93/52   Pulse: (!) 58   Resp: 16   Temp: 98.6 °F (37 °C)       General Appearance: Well appearing in no acute distress  Abdomen: Soft, non tender, non distended with normal bowel sounds, no masses    Labs:  Lab Results   Component Value Date    WBC 4.73 01/15/2025    HGB 12.1 01/15/2025    HCT 37.5 01/15/2025     01/15/2025    CHOL 132 01/15/2025    TRIG 46 01/15/2025    HDL 51 01/15/2025    ALT 10 01/15/2025    AST 18 01/15/2025     01/15/2025    K 4.8 01/15/2025     01/15/2025    CREATININE 0.6 01/15/2025    BUN 10 01/15/2025    CO2 24 01/15/2025    TSH 1.227 01/15/2025    HGBA1C 4.9 01/15/2025       I have explained the risks and benefits of this endoscopic procedure to the patient including  but not limited to bleeding, inflammation, infection, perforation, and death.      Singh Altamirano MD       [1]   Medications Prior to Admission   Medication Sig Dispense Refill Last Dose/Taking    tamoxifen (NOLVADEX) 10 MG Tab Take 10 mg by mouth once daily.   3/17/2025

## 2025-03-20 ENCOUNTER — CLINICAL SUPPORT (OUTPATIENT)
Dept: REHABILITATION | Facility: OTHER | Age: 45
End: 2025-03-20
Payer: COMMERCIAL

## 2025-03-20 DIAGNOSIS — R27.8 COORDINATION IMPAIRMENT: ICD-10-CM

## 2025-03-20 DIAGNOSIS — N81.89 PELVIC FLOOR WEAKNESS: Primary | ICD-10-CM

## 2025-03-20 LAB
FINAL PATHOLOGIC DIAGNOSIS: NORMAL
GROSS: NORMAL
Lab: NORMAL
MICROSCOPIC EXAM: NORMAL

## 2025-03-20 PROCEDURE — 97110 THERAPEUTIC EXERCISES: CPT

## 2025-03-20 PROCEDURE — 97112 NEUROMUSCULAR REEDUCATION: CPT

## 2025-03-20 NOTE — PROGRESS NOTES
Outpatient Rehab    Physical Therapy Visit    Patient Name: Rina Hurtado  MRN: [id number]  YOB: 1980  Encounter Date: 3/20/2025    Therapy Diagnosis:   Encounter Diagnoses   Name Primary?    Pelvic floor weakness Yes    Coordination impairment      Physician: Mirna Diehl MD    Physician Orders: Eval and Treat  Medical Diagnosis: Stress incontinence of urine    Visit # / Visits Authorized:  3 / 20  Date of Evaluation: 2/11/2025  Insurance Authorization Period: 2/11/2025 to 12/31/2025  Plan of Care Certification:  2/11/2025 to 5/11/2025      PT/PTA:     Number of PTA visits since last PT visit:   Time In: 0900   Time Out: 0940  Total Time: 40   Total Billable Time: 40    FOTO:  Intake Score:  %  Survey Score 1:  %  Survey Score 2:  %         Subjective   is unsure if she is performing pelvic floor exercises well in quadruped; also noted difficulty with hip thrusters.         Objective            Treatment:  Therapeutic Exercise  TE 1: SL hip ABD x10 ea  TE 2: Single leg thruster x10  TE 3: Side plank + hip ER x10  Balance/Neuromuscular Re-Education  NMR 1: kegel + TA bracing + alt arm lift 3# x5 ea  NMR 2: kegel + TA bracing + alt heel slide x5 ea  NMR 3: kegel + TA set in quadruped >> quadruped series + RTB x10 ea    Time Entry(in minutes):  Neuromuscular Re-Education Time Entry: 25  Therapeutic Exercise Time Entry: 15    Assessment & Plan   Assessment: Based on complaints, HEP reviewed this session. Resistance added to quadruped exercises for improved stability and core recruitment. Cueing required to limit anterior pelvic tilt and end range hip extension. Improved awareness noted with bird dogs and fire hydrants.  Evaluation/Treatment Tolerance: Patient tolerated treatment well    Patient will continue to benefit from skilled outpatient physical therapy to address the deficits listed in the problem list box on initial evaluation, provide pt/family education and to maximize pt's level of  independence in the home and community environment.     Patient's spiritual, cultural, and educational needs considered and patient agreeable to plan of care and goals.           Plan: progress dynamic hip strengthening    Goals:   Active       Long Term Goals       Pt to be able to perform a 10 second kegel x 10 reps to demonstrate improving strength and endurance needed for continence.  (Progressing)       Start:  02/11/25    Expected End:  04/22/25            Pt to report reduced incontinence with exercise to no more than 10%,  demonstrating improved pelvic floor muscle strength and coordination   (Progressing)       Start:  02/11/25    Expected End:  04/22/25            Pt to increase transverse abdominus strength to at least 4/5 to demonstrate improved strength needed for continence with ADLs.    (Progressing)       Start:  02/11/25    Expected End:  04/22/25               Short Term Goals       Pt to be able to perform a 5  second kegel x 10 reps to demonstrate improving strength and endurance needed for continence.  (Progressing)       Start:  02/11/25    Expected End:  03/18/25            Pt to report being able to sneeze without incontinence demonstrating improved pelvic floor strength and coordination to improve confidence in social situations  (Progressing)       Start:  02/11/25    Expected End:  03/18/25            Pt to increase strength of pelvic floor muscles to 4/5 to allow for improvement in urinary continence with exercise.   (Progressing)       Start:  02/11/25    Expected End:  03/18/25                CORTNEY GALVAN, PT

## 2025-03-21 ENCOUNTER — RESULTS FOLLOW-UP (OUTPATIENT)
Dept: GASTROENTEROLOGY | Facility: CLINIC | Age: 45
End: 2025-03-21

## 2025-03-25 ENCOUNTER — CLINICAL SUPPORT (OUTPATIENT)
Dept: REHABILITATION | Facility: OTHER | Age: 45
End: 2025-03-25
Payer: COMMERCIAL

## 2025-03-25 DIAGNOSIS — N81.89 PELVIC FLOOR WEAKNESS: Primary | ICD-10-CM

## 2025-03-25 DIAGNOSIS — R27.8 COORDINATION IMPAIRMENT: ICD-10-CM

## 2025-03-25 PROCEDURE — 97112 NEUROMUSCULAR REEDUCATION: CPT

## 2025-03-25 NOTE — PROGRESS NOTES
Outpatient Rehab    Physical Therapy Visit    Patient Name: Rina Hurtado  MRN: [id number]  YOB: 1980  Encounter Date: 3/25/2025    Therapy Diagnosis:   Encounter Diagnoses   Name Primary?    Pelvic floor weakness Yes    Coordination impairment      Physician: Mirna Diehl MD    Physician Orders: Eval and Treat  Medical Diagnosis: Stress incontinence of urine    Visit # / Visits Authorized:  4 / 20  Insurance Authorization Period: 2/11/2025 to 12/31/2025  Date of Evaluation: 2/11/2025  Plan of Care Certification: 2/11/2025 to 5/11/2025     PT/PTA:     Number of PTA visits since last PT visit:   Time In: 1400   Time Out: 1445  Total Time: 45   Total Billable Time:      FOTO:  Intake Score:  %  Survey Score 1:  %  Survey Score 2:  %         Subjective   Using band with exercises has been helpful..         Objective            Treatment:  Balance/Neuromuscular Re-Education  NMR 1: KB swings 15#  NMR 2: lunge progression  NMR 3: side lunge <> SLS  NMR 4: SL bridge + alt SA pull down  NMR 5: step up + alt SA row    Time Entry(in minutes):  Neuromuscular Re-Education Time Entry: 45    Assessment & Plan   Assessment: LE strengthening and coordination training progressed this session, with focus on improving glute medius and hip external rotater activation. Good performance demonstrated with all, with most improvement noted with cueing to press knees outward against invisible wall.  Evaluation/Treatment Tolerance: Patient tolerated treatment well    Patient will continue to benefit from skilled outpatient physical therapy to address the deficits listed in the problem list box on initial evaluation, provide pt/family education and to maximize pt's level of independence in the home and community environment.     Patient's spiritual, cultural, and educational needs considered and patient agreeable to plan of care and goals.           Plan: reassess PF    Goals:   Active       Long Term Goals       Pt  to be able to perform a 10 second kegel x 10 reps to demonstrate improving strength and endurance needed for continence.  (Progressing)       Start:  02/11/25    Expected End:  04/22/25            Pt to report reduced incontinence with exercise to no more than 10%,  demonstrating improved pelvic floor muscle strength and coordination   (Progressing)       Start:  02/11/25    Expected End:  04/22/25            Pt to increase transverse abdominus strength to at least 4/5 to demonstrate improved strength needed for continence with ADLs.    (Progressing)       Start:  02/11/25    Expected End:  04/22/25               Short Term Goals       Pt to be able to perform a 5  second kegel x 10 reps to demonstrate improving strength and endurance needed for continence.  (Progressing)       Start:  02/11/25    Expected End:  03/18/25            Pt to report being able to sneeze without incontinence demonstrating improved pelvic floor strength and coordination to improve confidence in social situations  (Progressing)       Start:  02/11/25    Expected End:  03/18/25            Pt to increase strength of pelvic floor muscles to 4/5 to allow for improvement in urinary continence with exercise.   (Progressing)       Start:  02/11/25    Expected End:  03/18/25                CORTNEY GALVAN, PT

## 2025-03-31 ENCOUNTER — CLINICAL SUPPORT (OUTPATIENT)
Dept: REHABILITATION | Facility: OTHER | Age: 45
End: 2025-03-31
Payer: COMMERCIAL

## 2025-03-31 DIAGNOSIS — N81.89 PELVIC FLOOR WEAKNESS: Primary | ICD-10-CM

## 2025-03-31 DIAGNOSIS — R27.8 COORDINATION IMPAIRMENT: ICD-10-CM

## 2025-03-31 PROCEDURE — 97112 NEUROMUSCULAR REEDUCATION: CPT

## 2025-03-31 NOTE — PROGRESS NOTES
Outpatient Rehab    Physical Therapy Progress Note    Patient Name: Rina Hurtado  MRN: [id number]  YOB: 1980  Encounter Date: 3/31/2025    Therapy Diagnosis:   Encounter Diagnoses   Name Primary?    Pelvic floor weakness Yes    Coordination impairment      Physician: Mirna Diehl MD    Physician Orders: Eval and Treat  Medical Diagnosis: Stress incontinence of urine    Visit # / Visits Authorized:  5 / 20  Insurance Authorization Period: 2/11/2025 to 12/31/2025  Date of Evaluation: 2/11/2025  Plan of Care Certification: 2/11/2025 to 5/11/2025     PT/PTA:     Number of PTA visits since last PT visit:   Time In: 1410   Time Out: 1450  Total Time: 40   Total Billable Time: 40     FOTO:  Intake Score:  %  Survey Score 1:  %  Survey Score 2:  %         Subjective   Exercises are going well. Control while jumping remains most limited..         Objective      Pelvic Floor Palpation  Pelvic Floor Exams Performed: External Pelvic and Internal Vaginal  External Pelvic Palpation Details: WNL  Internal Vaginal Palpation Details: WNL                                        Pelvic Floor Special Tests  Single Digit Intravaginal Assessment  Intravaginal Pelvic Floor Strength: 3  Intravaginal Pelvic Floor Endurance (sec): 10  Intravaginal Pelvic Floor Repetitions: 5  Intravaginal Pelvic Floor Relaxation Response: Normal            Treatment:  Balance/Neuromuscular Re-Education  NMR 1: plank walk out + alt shoulder taps x5  NMR 2: lunge stretch + rotation x5 ea  NMR 3: side lunge <> SLS x5 ea  NMR 4: ski jump <> SLS 2x10 ea  NMR 5: squat jump from mat 2x10, staggered 2x8 ea    Time Entry(in minutes):  Neuromuscular Re-Education Time Entry: 40    Assessment & Plan   Assessment: Dynamic strengthening and coordination training progressed to include light jumping activity this session. cueing required initially for improved load transfer with impact, though significant improvement noted with continued practice.  Based on performance, plan to continue progression NV.  Evaluation/Treatment Tolerance: Patient tolerated treatment well    Patient will continue to benefit from skilled outpatient physical therapy to address the deficits listed in the problem list box on initial evaluation, provide pt/family education and to maximize pt's level of independence in the home and community environment.     Patient's spiritual, cultural, and educational needs considered and patient agreeable to plan of care and goals.           Plan: progress jumping    Goals:   Active       Long Term Goals       Pt to be able to perform a 10 second kegel x 10 reps to demonstrate improving strength and endurance needed for continence.  (Progressing)       Start:  02/11/25    Expected End:  04/22/25            Pt to report reduced incontinence with exercise to no more than 10%,  demonstrating improved pelvic floor muscle strength and coordination   (Progressing)       Start:  02/11/25    Expected End:  04/22/25            Pt to increase transverse abdominus strength to at least 4/5 to demonstrate improved strength needed for continence with ADLs.    (Progressing)       Start:  02/11/25    Expected End:  04/22/25               Short Term Goals       Pt to be able to perform a 5  second kegel x 10 reps to demonstrate improving strength and endurance needed for continence.  (Met)       Start:  02/11/25    Expected End:  03/18/25    Resolved:  03/31/25         Pt to report being able to sneeze without incontinence demonstrating improved pelvic floor strength and coordination to improve confidence in social situations  (Progressing)       Start:  02/11/25    Expected End:  03/18/25            Pt to increase strength of pelvic floor muscles to 4/5 to allow for improvement in urinary continence with exercise.   (Progressing)       Start:  02/11/25    Expected End:  03/18/25                CORTNEY GALVAN, PT

## 2025-04-08 ENCOUNTER — CLINICAL SUPPORT (OUTPATIENT)
Dept: REHABILITATION | Facility: OTHER | Age: 45
End: 2025-04-08
Payer: COMMERCIAL

## 2025-04-08 DIAGNOSIS — N81.89 PELVIC FLOOR WEAKNESS: Primary | ICD-10-CM

## 2025-04-08 DIAGNOSIS — R27.8 COORDINATION IMPAIRMENT: ICD-10-CM

## 2025-04-08 PROCEDURE — 97112 NEUROMUSCULAR REEDUCATION: CPT

## 2025-04-08 NOTE — PROGRESS NOTES
Outpatient Rehab    Physical Therapy Visit    Patient Name: Rina Hurtado  MRN: [id number]  YOB: 1980  Encounter Date: 4/8/2025    Therapy Diagnosis:   Encounter Diagnoses   Name Primary?    Pelvic floor weakness Yes    Coordination impairment      Physician: Mirna Diehl MD    Physician Orders: Eval and Treat  Medical Diagnosis: Stress incontinence of urine    Visit # / Visits Authorized:  6 / 20  Insurance Authorization Period: 2/11/2025 to 12/31/2025  Date of Evaluation: 2/11/2025  Plan of Care Certification: 2/11/2025 to 5/11/2025     PT/PTA:     Number of PTA visits since last PT visit:   Time In: 1520   Time Out: 1600  Total Time: 40   Total Billable Time: 40    FOTO:  Intake Score:  %  Survey Score 1:  %  Survey Score 2:  %         Subjective   No new reports; exercises are going well..         Objective            Treatment:  Balance/Neuromuscular Re-Education  NMR 1: plank walk out + alt shoulder taps x5  NMR 2: quadruped multifidi lifts 2x15 ea  NMR 3: lunge + rotation x5 ea  NMR 4: squat jumps + chair tap 3x8  NMR 5: ski jumps 2x10  NMR 6: lateral jump x10  NMR 7: lateral SL hop x10  NMR 8: Forward line jump x3 + YTB 2x10  NMR 9: side step + YTB at ankles x5 laps  NMR 10: in/out jumps 3x10    Time Entry(in minutes):  Neuromuscular Re-Education Time Entry: 40    Assessment & Plan   Assessment:         Patient will continue to benefit from skilled outpatient physical therapy to address the deficits listed in the problem list box on initial evaluation, provide pt/family education and to maximize pt's level of independence in the home and community environment.     Patient's spiritual, cultural, and educational needs considered and patient agreeable to plan of care and goals.           Plan: reassess PF    Goals:   Active       Long Term Goals       Pt to be able to perform a 10 second kegel x 10 reps to demonstrate improving strength and endurance needed for continence.   (Progressing)       Start:  02/11/25    Expected End:  04/22/25            Pt to report reduced incontinence with exercise to no more than 10%,  demonstrating improved pelvic floor muscle strength and coordination   (Met)       Start:  02/11/25    Expected End:  04/22/25    Resolved:  04/08/25         Pt to increase transverse abdominus strength to at least 4/5 to demonstrate improved strength needed for continence with ADLs.    (Progressing)       Start:  02/11/25    Expected End:  04/22/25               Short Term Goals       Pt to be able to perform a 5  second kegel x 10 reps to demonstrate improving strength and endurance needed for continence.  (Met)       Start:  02/11/25    Expected End:  03/18/25    Resolved:  03/31/25         Pt to report being able to sneeze without incontinence demonstrating improved pelvic floor strength and coordination to improve confidence in social situations  (Met)       Start:  02/11/25    Expected End:  03/18/25    Resolved:  04/08/25         Pt to increase strength of pelvic floor muscles to 4/5 to allow for improvement in urinary continence with exercise.   (Progressing)       Start:  02/11/25    Expected End:  03/18/25                CORTNEY GALVAN, PT

## 2025-04-28 ENCOUNTER — PATIENT MESSAGE (OUTPATIENT)
Dept: REHABILITATION | Facility: OTHER | Age: 45
End: 2025-04-28
Payer: COMMERCIAL

## 2025-04-28 ENCOUNTER — OFFICE VISIT (OUTPATIENT)
Dept: OBSTETRICS AND GYNECOLOGY | Facility: CLINIC | Age: 45
End: 2025-04-28
Attending: STUDENT IN AN ORGANIZED HEALTH CARE EDUCATION/TRAINING PROGRAM
Payer: COMMERCIAL

## 2025-04-28 VITALS
DIASTOLIC BLOOD PRESSURE: 62 MMHG | BODY MASS INDEX: 25.15 KG/M2 | HEIGHT: 62 IN | SYSTOLIC BLOOD PRESSURE: 98 MMHG | WEIGHT: 136.69 LBS

## 2025-04-28 DIAGNOSIS — Z01.419 WELL WOMAN EXAM: ICD-10-CM

## 2025-04-28 DIAGNOSIS — Z11.51 ENCOUNTER FOR SCREENING FOR HUMAN PAPILLOMAVIRUS (HPV): ICD-10-CM

## 2025-04-28 DIAGNOSIS — Z12.31 ENCOUNTER FOR SCREENING MAMMOGRAM FOR BREAST CANCER: ICD-10-CM

## 2025-04-28 DIAGNOSIS — Z12.4 PAP SMEAR FOR CERVICAL CANCER SCREENING: Primary | ICD-10-CM

## 2025-04-28 PROCEDURE — 3074F SYST BP LT 130 MM HG: CPT | Mod: CPTII,S$GLB,, | Performed by: STUDENT IN AN ORGANIZED HEALTH CARE EDUCATION/TRAINING PROGRAM

## 2025-04-28 PROCEDURE — 88175 CYTOPATH C/V AUTO FLUID REDO: CPT | Mod: TC | Performed by: STUDENT IN AN ORGANIZED HEALTH CARE EDUCATION/TRAINING PROGRAM

## 2025-04-28 PROCEDURE — 3044F HG A1C LEVEL LT 7.0%: CPT | Mod: CPTII,S$GLB,, | Performed by: STUDENT IN AN ORGANIZED HEALTH CARE EDUCATION/TRAINING PROGRAM

## 2025-04-28 PROCEDURE — 87624 HPV HI-RISK TYP POOLED RSLT: CPT | Performed by: STUDENT IN AN ORGANIZED HEALTH CARE EDUCATION/TRAINING PROGRAM

## 2025-04-28 PROCEDURE — 1159F MED LIST DOCD IN RCRD: CPT | Mod: CPTII,S$GLB,, | Performed by: STUDENT IN AN ORGANIZED HEALTH CARE EDUCATION/TRAINING PROGRAM

## 2025-04-28 PROCEDURE — 3078F DIAST BP <80 MM HG: CPT | Mod: CPTII,S$GLB,, | Performed by: STUDENT IN AN ORGANIZED HEALTH CARE EDUCATION/TRAINING PROGRAM

## 2025-04-28 PROCEDURE — 3008F BODY MASS INDEX DOCD: CPT | Mod: CPTII,S$GLB,, | Performed by: STUDENT IN AN ORGANIZED HEALTH CARE EDUCATION/TRAINING PROGRAM

## 2025-04-28 PROCEDURE — 1160F RVW MEDS BY RX/DR IN RCRD: CPT | Mod: CPTII,S$GLB,, | Performed by: STUDENT IN AN ORGANIZED HEALTH CARE EDUCATION/TRAINING PROGRAM

## 2025-04-28 PROCEDURE — 99999 PR PBB SHADOW E&M-EST. PATIENT-LVL III: CPT | Mod: PBBFAC,,, | Performed by: STUDENT IN AN ORGANIZED HEALTH CARE EDUCATION/TRAINING PROGRAM

## 2025-04-28 PROCEDURE — 99386 PREV VISIT NEW AGE 40-64: CPT | Mod: S$GLB,,, | Performed by: STUDENT IN AN ORGANIZED HEALTH CARE EDUCATION/TRAINING PROGRAM

## 2025-04-28 NOTE — PROGRESS NOTES
Chief Complaint: Well Woman Exam     HPI:      Rina Hurtado is a 44 y.o.  who presents today for well woman exam.  LMP: Patient's last menstrual period was 2025.  History of breast cancer s/p lumpectomy, radiation.  Currently on tamoxifen.  Reports some mood changes on tamoxien and cycles that are spacing.  Are not heavy or more frequent.  Is talking with medical oncologist about possible restarting ssri to help with symptoms.  No other issues, problems, or complaints. Specifically, patient denies abnormal vaginal bleeding, discharge, pelvic pain, urinary problems, or changes in appetite.     Previous Pap: No result found   Previous Mammogram: needs    Colonoscopy: up to date      OB History          3    Para   2    Term   2            AB   1    Living   2         SAB   1    IAB        Ectopic        Multiple   0    Live Births   2               Past Medical History:   Diagnosis Date    AMA (advanced maternal age) multigravida 35+      Past Surgical History:   Procedure Laterality Date    COLONOSCOPY, SCREENING, LOW RISK PATIENT N/A 3/18/2025    Procedure: COLONOSCOPY, SCREENING, LOW RISK PATIENT;  Surgeon: Singh Altamirano MD;  Location: [address];  Service: Gastroenterology;  Laterality: N/A;   ref by Dr. SENG Diehl, [facility], portal. ric  3/10 precall complete. kw     Social History[1]  Family History   Problem Relation Name Age of Onset    No Known Problems Father      Diabetes Mother      No Known Problems Brother adopted     Breast cancer Neg Hx      Colon cancer Neg Hx      Stroke Neg Hx      Ovarian cancer Neg Hx       Current Medications[2]    ROS:     GENERAL: Denies unintentional weight gain or weight loss. Feeling well overall.   SKIN: Denies rash or lesions.   HEENT: Denies headaches, or vision changes.   CARDIOVASCULAR: Denies palpitations or chest pain.   RESPIRATORY: Denies shortness of breath or dyspnea on exertion.  BREASTS: Denies pain, lumps, or  "nipple discharge.   ABDOMEN: Denies abdominal pain, constipation, diarrhea, nausea, vomiting, change in appetite.  URINARY: Denies frequency, dysuria, hematuria.  NEUROLOGIC: Denies syncope or weakness.   PSYCHIATRIC: Denies depression, anxiety or mood swings.    Physical Exam:      PHYSICAL EXAM:  BP 98/62   Ht 5' 2" (1.575 m)   Wt 62 kg (136 lb 11 oz)   LMP 2025   BMI 25.00 kg/m²   Body mass index is 25 kg/m².     APPEARANCE: Well nourished, well developed, in no acute distress.  PSYCH: Appropriate mood and affect.  SKIN: No acne or hirsutism.  NECK: Neck symmetric without masses or thyromegaly.  NODES: No inguinal, axillary, or supraclavicular lymph node enlargement.  BREASTS: R breast without skin changes or visible lesions.  L breast with well healed incision.  No palpable masses or nipple discharge bilaterally.  ABDOMEN: Soft.  No tenderness or masses.    PELVIC: Normal external genitalia without lesions.  Normal hair distribution.  Adequate perineal body, normal urethral meatus.  Vagina moist and well rugated without lesions or discharge.  Cervix pink, without lesions, discharge or tenderness.  No significant cystocele or rectocele.  Bimanual exam shows uterus to be normal size, regular, mobile and nontender.  Adnexa without masses or tenderness.    EXTREMITIES: No edema.  No tenderness to palpation.  Female chaperone was present for exam.       Assessment/Plan:     44 y.o.     Pap smear for cervical cancer screening  -     Liquid-Based Pap Smear, Screening    Encounter for screening for human papillomavirus (HPV)  -     Liquid-Based Pap Smear, Screening    Encounter for screening mammogram for breast cancer  -     Mammo Digital Screening Bilat w/ Abdoulaye (XPD); Future; Expected date: 2025    Well woman exam          Counselin.  Annual exam performed today without difficulty.  Patient was counseled today on current ASCCP pap guidelines, the recommendation for yearly pelvic exams, " healthy diet and exercise routines, continue screening and post treatment follow up - mmg vs mri q 6months.  Will discuss with Hem Onc and let me know what she decides. Pap smear collected.  All questions answered.    2.  Contraception:  declines.  3.  Continue follow up with medical oncology.  4.  Follow up with PCP for other health maintenance.  5.  Follow up in about 1 year (around 4/28/2026).      Use of the [facility] Patient Portal discussed and encouraged during today's visit.                  [1]   Social History  Socioeconomic History    Marital status:    Tobacco Use    Smoking status: Never    Smokeless tobacco: Never   Substance and Sexual Activity    Alcohol use: Yes     Comment: 2 drinks every other week    Drug use: Never    Sexual activity: Yes     Partners: Male     Birth control/protection: None     Social Drivers of Health     Financial Resource Strain: Low Risk  (1/8/2025)    Overall Financial Resource Strain (CARDIA)     Difficulty of Paying Living Expenses: Not hard at all   Food Insecurity: No Food Insecurity (1/8/2025)    Hunger Vital Sign     Worried About Running Out of Food in the Last Year: Never true     Ran Out of Food in the Last Year: Never true   Transportation Needs: No Transportation Needs (8/19/2024)    Received from [facility] - Transportation     Lack of Transportation (Medical): No     Lack of Transportation (Non-Medical): No   Physical Activity: Sufficiently Active (2/11/2025)    Exercise Vital Sign     Days of Exercise per Week: 5 days     Minutes of Exercise per Session: 60 min   Stress: No Stress Concern Present (1/8/2025)    [facility] [employer] - Occupational Stress Questionnaire     Feeling of Stress : Only a little   Housing Stability: Unknown (1/8/2025)    Housing Stability Vital Sign     Unable to Pay for Housing in the Last Year: No   [2]   Current Outpatient Medications:     tamoxifen (NOLVADEX) 10 MG Tab, Take 10 mg by mouth once  daily., Disp: , Rfl:

## 2025-04-30 LAB
HPV DNA, HIGH RISK TYPE 16, PCR (OHS): NEGATIVE
HPV DNA, HIGH RISK TYPE 18, PCR (OHS): NEGATIVE
HPV DNA, HIGH RISK TYPE OTHER, PCR (OHS): NEGATIVE
INSULIN SERPL-ACNC: NORMAL U[IU]/ML
LAB AP BETHESDA CATEGORY: NORMAL
LAB AP CLINICAL FINDINGS: NORMAL
LAB AP CONTRACEPTIVES: NORMAL
LAB AP GYN ADDITIONAL FINDINGS: NORMAL
LAB AP LMP DATE: NORMAL
LAB AP OCHS PAP SPECIMEN ADEQUACY: NORMAL
LAB AP OHS PAP INTERPRETATION: NORMAL
LAB AP PAP DISCLAIMER COMMENTS: NORMAL
LAB AP PAP ESTROGEN REPLACEMENT THERAPY: NORMAL
LAB AP PAP PMP: NORMAL
LAB AP PAP PREVIOUS BX: NORMAL
LAB AP PAP PRIOR TREATMENT: NORMAL
LAB AP PERFORMING LOCATION(S): NORMAL

## 2025-05-01 ENCOUNTER — RESULTS FOLLOW-UP (OUTPATIENT)
Dept: OBSTETRICS AND GYNECOLOGY | Facility: CLINIC | Age: 45
End: 2025-05-01

## 2025-05-20 ENCOUNTER — CLINICAL SUPPORT (OUTPATIENT)
Dept: REHABILITATION | Facility: OTHER | Age: 45
End: 2025-05-20
Payer: COMMERCIAL

## 2025-05-20 DIAGNOSIS — R27.8 COORDINATION IMPAIRMENT: ICD-10-CM

## 2025-05-20 DIAGNOSIS — N81.89 PELVIC FLOOR WEAKNESS: Primary | ICD-10-CM

## 2025-05-20 PROCEDURE — 97112 NEUROMUSCULAR REEDUCATION: CPT

## 2025-05-20 PROCEDURE — 97110 THERAPEUTIC EXERCISES: CPT

## 2025-05-20 NOTE — PROGRESS NOTES
Outpatient Rehab    Physical Therapy Visit    Patient Name: Rina Hurtado  MRN: [id number]  YOB: 1980  Encounter Date: 5/20/2025    Therapy Diagnosis:   Encounter Diagnoses   Name Primary?    Pelvic floor weakness Yes    Coordination impairment      Physician: Mirna Diehl MD    Physician Orders: Eval and Treat  Medical Diagnosis: Stress incontinence of urine    Visit # / Visits Authorized:  7 / 20  Insurance Authorization Period: 2/11/2025 to 12/31/2025  Date of Evaluation: 2/11/2025  Plan of Care Certification: 2/11/2025 to 5/11/2025     PT/PTA:     Number of PTA visits since last PT visit:   Time In: 0817   Time Out: 0900  Total Time: 43   Total Billable Time: 43    FOTO:  Intake Score:  %  Survey Score 1:  %  Survey Score 2:  %         Subjective   continues with leaking most with running; less running with jumping..         Objective            Treatment:  Therapeutic Exercise  TE 1: curtsy lunge x10 ea  TE 2: plank walk out + shoulder tap x3 ea, + toe tap x3ea  TE 3: plank walk out + plank felix x10  Balance/Neuromuscular Re-Education  NMR 1: squat jump + mat tap 2x8  NMR 2: ski jump 2x5 ea  NMR 3: ski jump + SL hop 2x5 ea  NMR 4: hop + SL jump 3x5 ea  NMR 5: 3x SL hop 3x ea  NMR 6: 3x medial,lateral hop 3x ea    Time Entry(in minutes):  Neuromuscular Re-Education Time Entry: 33  Therapeutic Exercise Time Entry: 10    Assessment & Plan   Assessment: Jumping progression continued with slight improvement in performance demonstrated. Use of adductors for power and stability continues to be easier for Rina than using lateral glutes. Based on performance plan to continue posterolateral strengthening and single leg stabilization. Backward jumping remains challenging for Rina; hold for now.  Evaluation/Treatment Tolerance: Patient tolerated treatment well    Patient will continue to benefit from skilled outpatient physical therapy to address the deficits listed in the problem list box  on initial evaluation, provide pt/family education and to maximize pt's level of independence in the home and community environment.     Patient's spiritual, cultural, and educational needs considered and patient agreeable to plan of care and goals.           Plan: reassess PF    Goals:   Active       Long Term Goals       Pt to be able to perform a 10 second kegel x 10 reps to demonstrate improving strength and endurance needed for continence.  (Progressing)       Start:  02/11/25    Expected End:  04/22/25            Pt to report reduced incontinence with exercise to no more than 10%,  demonstrating improved pelvic floor muscle strength and coordination   (Met)       Start:  02/11/25    Expected End:  04/22/25    Resolved:  04/08/25         Pt to increase transverse abdominus strength to at least 4/5 to demonstrate improved strength needed for continence with ADLs.    (Progressing)       Start:  02/11/25    Expected End:  04/22/25               Short Term Goals       Pt to be able to perform a 5  second kegel x 10 reps to demonstrate improving strength and endurance needed for continence.  (Met)       Start:  02/11/25    Expected End:  03/18/25    Resolved:  03/31/25         Pt to report being able to sneeze without incontinence demonstrating improved pelvic floor strength and coordination to improve confidence in social situations  (Met)       Start:  02/11/25    Expected End:  03/18/25    Resolved:  04/08/25         Pt to increase strength of pelvic floor muscles to 4/5 to allow for improvement in urinary continence with exercise.   (Progressing)       Start:  02/11/25    Expected End:  03/18/25                CORTNEY GALVAN, PT

## 2025-05-27 ENCOUNTER — HOSPITAL ENCOUNTER (OUTPATIENT)
Dept: RADIOLOGY | Facility: OTHER | Age: 45
Discharge: HOME | End: 2025-05-27
Attending: STUDENT IN AN ORGANIZED HEALTH CARE EDUCATION/TRAINING PROGRAM
Payer: COMMERCIAL

## 2025-05-27 DIAGNOSIS — Z12.31 ENCOUNTER FOR SCREENING MAMMOGRAM FOR BREAST CANCER: ICD-10-CM

## 2025-05-27 PROCEDURE — 77067 SCR MAMMO BI INCL CAD: CPT | Mod: 26,,, | Performed by: RADIOLOGY

## 2025-05-27 PROCEDURE — 77063 BREAST TOMOSYNTHESIS BI: CPT | Mod: TC

## 2025-05-27 PROCEDURE — 77063 BREAST TOMOSYNTHESIS BI: CPT | Mod: 26,,, | Performed by: RADIOLOGY

## 2025-06-03 ENCOUNTER — CLINICAL SUPPORT (OUTPATIENT)
Dept: REHABILITATION | Facility: OTHER | Age: 45
End: 2025-06-03
Payer: COMMERCIAL

## 2025-06-03 DIAGNOSIS — R27.8 COORDINATION IMPAIRMENT: ICD-10-CM

## 2025-06-03 DIAGNOSIS — N81.89 PELVIC FLOOR WEAKNESS: Primary | ICD-10-CM

## 2025-06-03 PROCEDURE — 97112 NEUROMUSCULAR REEDUCATION: CPT

## 2025-06-03 PROCEDURE — 97110 THERAPEUTIC EXERCISES: CPT

## 2025-07-08 ENCOUNTER — CLINICAL SUPPORT (OUTPATIENT)
Dept: REHABILITATION | Facility: OTHER | Age: 45
End: 2025-07-08
Payer: COMMERCIAL

## 2025-07-08 DIAGNOSIS — R27.8 COORDINATION IMPAIRMENT: ICD-10-CM

## 2025-07-08 DIAGNOSIS — N81.89 PELVIC FLOOR WEAKNESS: Primary | ICD-10-CM

## 2025-07-08 PROCEDURE — 97112 NEUROMUSCULAR REEDUCATION: CPT

## 2025-07-08 PROCEDURE — 97110 THERAPEUTIC EXERCISES: CPT

## 2025-07-08 NOTE — PROGRESS NOTES
"  Outpatient Rehab    Physical Therapy Progress Note : Updated Plan of Care    Patient Name: Rina Hurtado  MRN: [id number]  YOB: 1980  Encounter Date: 7/8/2025    Therapy Diagnosis:   Encounter Diagnoses   Name Primary?    Pelvic floor weakness Yes    Coordination impairment      Physician: Mirna Diehl MD    Physician Orders: Eval and Treat  Medical Diagnosis: Stress incontinence of urine  Surgical Diagnosis: Not applicable for this Episode   Surgical Date: Not applicable for this Episode  Days Since Last Surgery: Not applicable for this Episode    Visit # / Visits Authorized:  9 / 20  Insurance Authorization Period: 2/11/2025 to 12/31/2025  Date of Evaluation: 2/11/2025   Plan of Care Certification: 7/8/2025 - 10/8/2025     PT/PTA:     Number of PTA visits since last PT visit:   Time In: 0820   Time Out: 0900  Total Time (in minutes): 40   Total Billable Time (in minutes): 40    FOTO:  Intake Score:  %  Survey Score 2:  %  Survey Score 3:  %    Precautions:       Subjective   no leakage since previous session, though she has not been running..         Objective         Single Leg Squat Testing - Right Leg      Observations: Trendelenburg               Single Leg Squat Testing - Left Leg      Observations: Trendelenburg               Lunge Testing - Right      Observations: Knee Valgus and Anterior Tibial Translation Over Toes     Right Lunge - Ankle/Foot Details: increased internal rotation and forefoot pronation compared to left       Lunge Testing - Left     Observations: Knee Valgus and Anterior Tibial Translation Over Toes                   Treatment:  Therapeutic Exercise  TE 1: side step GTB 2x20'  TE 2: bwd monster walk GTB 2x20'  TE 3: elliptical lvl5 x5'  Balance/Neuromuscular Re-Education  NMR 1: scissor jumps to box 3x30"  NMR 2: in/out squat jumps to box 2x10  NMR 3: side step down 2x10 ea  NMR 4: hopping - lateral, fwd/bwd, box x20" ea    Time Entry(in minutes):  Neuromuscular " Re-Education Time Entry: 30  Therapeutic Exercise Time Entry: 10    Assessment & Plan        The patient will continue to benefit from skilled outpatient physical therapy in order to address the deficits listed in the problem list on the initial evaluation, provide patient and family education, and maximize the patients level of independence in the home and community environments.     The patient's spiritual, cultural, and educational needs were considered, and the patient is agreeable to the plan of care and goals.           Goals:   Active       Short Term Goals       Pt to be able to perform a 5  second kegel x 10 reps to demonstrate improving strength and endurance needed for continence.  (Met)       Start:  02/11/25    Expected End:  03/18/25    Resolved:  03/31/25         Pt to report being able to sneeze without incontinence demonstrating improved pelvic floor strength and coordination to improve confidence in social situations  (Met)       Start:  02/11/25    Expected End:  03/18/25    Resolved:  04/08/25         Pt to increase strength of pelvic floor muscles to 4/5 to allow for improvement in urinary continence with exercise.   (Progressing)       Start:  02/11/25    Expected End:  03/18/25              Resolved       Long Term Goals       Pt to be able to perform a 10 second kegel x 10 reps to demonstrate improving strength and endurance needed for continence.  (Met)       Start:  02/11/25    Expected End:  04/22/25    Resolved:  07/08/25         Pt to report reduced incontinence with exercise to no more than 10%,  demonstrating improved pelvic floor muscle strength and coordination   (Met)       Start:  02/11/25    Expected End:  04/22/25    Resolved:  04/08/25         Pt to increase transverse abdominus strength to at least 4/5 to demonstrate improved strength needed for continence with ADLs.    (Met)       Start:  02/11/25    Expected End:  04/22/25    Resolved:  07/08/25             CORTNEY GALVAN  PT

## 2025-07-08 NOTE — PROGRESS NOTES
"  Outpatient Rehab    Physical Therapy Visit    Patient Name: Rina Hurtado  MRN: [id number]  YOB: 1980  Encounter Date: 6/3/2025    Therapy Diagnosis:   Encounter Diagnoses   Name Primary?    Pelvic floor weakness Yes    Coordination impairment      Physician: Mirna Diehl MD    Physician Orders: Eval and Treat  Medical Diagnosis: Stress incontinence of urine  Surgical Diagnosis: Not applicable for this Episode   Surgical Date: Not applicable for this Episode  Days Since Last Surgery: Not applicable for this Episode    Visit # / Visits Authorized:  9 / 20  Insurance Authorization Period: 2/11/2025 to 12/31/2025  Date of Evaluation: 2/11/2025  Plan of Care Certification: 2/11/2025 to 5/11/2025      PT/PTA:     Number of PTA visits since last PT visit:   Time In: 1600   Time Out: 1650  Total Time (in minutes): 50   Total Billable Time (in minutes): 50    FOTO:  Intake Score:  %  Survey Score 2:  %  Survey Score 3:  %    Precautions:       Subjective   no significant change since previous session..         Objective      Pelvic Floor Special Tests  Single Digit Intravaginal Assessment  Intravaginal Pelvic Floor Strength: 3  Intravaginal Pelvic Floor Endurance (sec): 10  Intravaginal Pelvic Floor Repetitions: 10  Intravaginal Pelvic Floor Quick Flicks: 6  Intravaginal Pelvic Floor Co-Contraction Substitution: Absent  Intravaginal Pelvic Floor Relaxation Response: Normal             Treatment:  Therapeutic Exercise  TE 1: side step GTB 3x15'  TE 2: monster walk GTB 3x15'  TE 3: plank walk out + shoulder tap x5 ea  TE 4: plank jacks 3x20"      Time Entry(in minutes):       Assessment & Plan   Assessment: Pelvic floor reassessment performed this session, with Rina demonstrating improved quality of contraction and improved endurance, though strength remains slightly limited. She continues to progress with hip strengthening improved pelvic floor coordination training; however, based on performance " and progress, she would benefit from continued pelvic floor physical therapy for further progression of home exercise program.   Evaluation/Treatment Tolerance: Patient tolerated treatment well    The patient will continue to benefit from skilled outpatient physical therapy in order to address the deficits listed in the problem list on the initial evaluation, provide patient and family education, and maximize the patients level of independence in the home and community environments.     The patient's spiritual, cultural, and educational needs were considered, and the patient is agreeable to the plan of care and goals.           Plan: progress dynamic jumping    Goals:   Active       Long Term Goals       Pt to be able to perform a 10 second kegel x 10 reps to demonstrate improving strength and endurance needed for continence.  (Progressing)       Start:  02/11/25    Expected End:  04/22/25            Pt to report reduced incontinence with exercise to no more than 10%,  demonstrating improved pelvic floor muscle strength and coordination   (Met)       Start:  02/11/25    Expected End:  04/22/25    Resolved:  04/08/25         Pt to increase transverse abdominus strength to at least 4/5 to demonstrate improved strength needed for continence with ADLs.    (Progressing)       Start:  02/11/25    Expected End:  04/22/25               Short Term Goals       Pt to be able to perform a 5  second kegel x 10 reps to demonstrate improving strength and endurance needed for continence.  (Met)       Start:  02/11/25    Expected End:  03/18/25    Resolved:  03/31/25         Pt to report being able to sneeze without incontinence demonstrating improved pelvic floor strength and coordination to improve confidence in social situations  (Met)       Start:  02/11/25    Expected End:  03/18/25    Resolved:  04/08/25         Pt to increase strength of pelvic floor muscles to 4/5 to allow for improvement in urinary continence with  exercise.   (Progressing)       Start:  02/11/25    Expected End:  03/18/25                CORTNEY GALVAN PT